# Patient Record
Sex: FEMALE | Race: WHITE | NOT HISPANIC OR LATINO | URBAN - METROPOLITAN AREA
[De-identification: names, ages, dates, MRNs, and addresses within clinical notes are randomized per-mention and may not be internally consistent; named-entity substitution may affect disease eponyms.]

---

## 2022-09-27 ENCOUNTER — TRANSCRIBE ORDERS (OUTPATIENT)
Dept: ADMINISTRATIVE | Facility: HOSPITAL | Age: 57
End: 2022-09-27

## 2022-09-27 DIAGNOSIS — Z12.31 SCREENING MAMMOGRAM FOR BREAST CANCER: Primary | ICD-10-CM

## 2022-10-05 ENCOUNTER — HOSPITAL ENCOUNTER (OUTPATIENT)
Dept: MAMMOGRAPHY | Facility: HOSPITAL | Age: 57
Discharge: HOME | End: 2022-10-05

## 2022-10-05 DIAGNOSIS — Z12.31 SCREENING MAMMOGRAM FOR BREAST CANCER: ICD-10-CM

## 2022-10-07 ENCOUNTER — TRANSCRIBE ORDERS (OUTPATIENT)
Dept: ADMINISTRATIVE | Facility: HOSPITAL | Age: 57
End: 2022-10-07

## 2022-10-07 DIAGNOSIS — N64.4 BREAST PAIN: Primary | ICD-10-CM

## 2022-10-14 ENCOUNTER — HOSPITAL ENCOUNTER (OUTPATIENT)
Dept: MAMMOGRAPHY | Facility: HOSPITAL | Age: 57
Discharge: HOME | End: 2022-10-14

## 2022-10-14 ENCOUNTER — HOSPITAL ENCOUNTER (OUTPATIENT)
Dept: ULTRASOUND IMAGING | Facility: HOSPITAL | Age: 57
Discharge: HOME | End: 2022-10-14

## 2022-10-14 DIAGNOSIS — N64.4 BREAST PAIN: ICD-10-CM

## 2022-10-14 PROCEDURE — 77066 DX MAMMO INCL CAD BI: CPT

## 2022-10-14 PROCEDURE — 76642 ULTRASOUND BREAST LIMITED: CPT

## 2022-10-14 PROCEDURE — G0279 TOMOSYNTHESIS, MAMMO: HCPCS

## 2023-02-20 ENCOUNTER — APPOINTMENT (OUTPATIENT)
Dept: CT IMAGING | Facility: HOSPITAL | Age: 58
End: 2023-02-20
Payer: COMMERCIAL

## 2023-02-20 ENCOUNTER — HOSPITAL ENCOUNTER (EMERGENCY)
Facility: HOSPITAL | Age: 58
Discharge: HOME | End: 2023-02-20
Attending: EMERGENCY MEDICINE | Admitting: SURGERY
Payer: COMMERCIAL

## 2023-02-20 ENCOUNTER — ANESTHESIA EVENT (OUTPATIENT)
Dept: PERIOP | Facility: HOSPITAL | Age: 58
End: 2023-02-20
Payer: COMMERCIAL

## 2023-02-20 ENCOUNTER — ANESTHESIA (OUTPATIENT)
Dept: PERIOP | Facility: HOSPITAL | Age: 58
End: 2023-02-20
Payer: COMMERCIAL

## 2023-02-20 VITALS
HEART RATE: 87 BPM | OXYGEN SATURATION: 93 % | RESPIRATION RATE: 16 BRPM | HEIGHT: 64 IN | SYSTOLIC BLOOD PRESSURE: 96 MMHG | WEIGHT: 169.53 LBS | DIASTOLIC BLOOD PRESSURE: 57 MMHG | TEMPERATURE: 97 F | BODY MASS INDEX: 28.94 KG/M2

## 2023-02-20 DIAGNOSIS — K35.80 ACUTE APPENDICITIS, UNSPECIFIED ACUTE APPENDICITIS TYPE: Primary | ICD-10-CM

## 2023-02-20 PROBLEM — K35.30 ACUTE APPENDICITIS WITH LOCALIZED PERITONITIS: Status: ACTIVE | Noted: 2023-02-20

## 2023-02-20 LAB
ALBUMIN SERPL-MCNC: 4.6 G/DL (ref 3.5–5.2)
ALBUMIN/GLOB SERPL: 1.4 G/DL
ALP SERPL-CCNC: 72 U/L (ref 39–117)
ALT SERPL W P-5'-P-CCNC: 46 U/L (ref 1–33)
ANION GAP SERPL CALCULATED.3IONS-SCNC: 9.4 MMOL/L (ref 5–15)
AST SERPL-CCNC: 33 U/L (ref 1–32)
BASOPHILS # BLD AUTO: 0.04 10*3/MM3 (ref 0–0.2)
BASOPHILS NFR BLD AUTO: 0.3 % (ref 0–1.5)
BILIRUB SERPL-MCNC: 0.3 MG/DL (ref 0–1.2)
BILIRUB UR QL STRIP: NEGATIVE
BUN SERPL-MCNC: 17 MG/DL (ref 6–20)
BUN/CREAT SERPL: 23.9 (ref 7–25)
CALCIUM SPEC-SCNC: 9.8 MG/DL (ref 8.6–10.5)
CHLORIDE SERPL-SCNC: 102 MMOL/L (ref 98–107)
CLARITY UR: CLEAR
CO2 SERPL-SCNC: 25.6 MMOL/L (ref 22–29)
COLOR UR: YELLOW
CREAT SERPL-MCNC: 0.71 MG/DL (ref 0.57–1)
D-LACTATE SERPL-SCNC: 1.9 MMOL/L (ref 0.5–2)
DEPRECATED RDW RBC AUTO: 41.1 FL (ref 37–54)
EGFRCR SERPLBLD CKD-EPI 2021: 99.3 ML/MIN/1.73
EOSINOPHIL # BLD AUTO: 0.1 10*3/MM3 (ref 0–0.4)
EOSINOPHIL NFR BLD AUTO: 0.8 % (ref 0.3–6.2)
ERYTHROCYTE [DISTWIDTH] IN BLOOD BY AUTOMATED COUNT: 12.5 % (ref 12.3–15.4)
FLUAV AG NPH QL: NEGATIVE
FLUBV AG NPH QL IA: NEGATIVE
GLOBULIN UR ELPH-MCNC: 3.2 GM/DL
GLUCOSE BLDC GLUCOMTR-MCNC: 112 MG/DL (ref 70–99)
GLUCOSE SERPL-MCNC: 134 MG/DL (ref 65–99)
GLUCOSE UR STRIP-MCNC: NEGATIVE MG/DL
HCT VFR BLD AUTO: 41.3 % (ref 34–46.6)
HGB BLD-MCNC: 13.7 G/DL (ref 12–15.9)
HGB UR QL STRIP.AUTO: NEGATIVE
HOLD SPECIMEN: NORMAL
HOLD SPECIMEN: NORMAL
IMM GRANULOCYTES # BLD AUTO: 0.05 10*3/MM3 (ref 0–0.05)
IMM GRANULOCYTES NFR BLD AUTO: 0.4 % (ref 0–0.5)
KETONES UR QL STRIP: NEGATIVE
LEUKOCYTE ESTERASE UR QL STRIP.AUTO: NEGATIVE
LIPASE SERPL-CCNC: 30 U/L (ref 13–60)
LYMPHOCYTES # BLD AUTO: 1.3 10*3/MM3 (ref 0.7–3.1)
LYMPHOCYTES NFR BLD AUTO: 10.1 % (ref 19.6–45.3)
MCH RBC QN AUTO: 29.8 PG (ref 26.6–33)
MCHC RBC AUTO-ENTMCNC: 33.2 G/DL (ref 31.5–35.7)
MCV RBC AUTO: 90 FL (ref 79–97)
MONOCYTES # BLD AUTO: 0.74 10*3/MM3 (ref 0.1–0.9)
MONOCYTES NFR BLD AUTO: 5.8 % (ref 5–12)
NEUTROPHILS NFR BLD AUTO: 10.58 10*3/MM3 (ref 1.7–7)
NEUTROPHILS NFR BLD AUTO: 82.6 % (ref 42.7–76)
NITRITE UR QL STRIP: NEGATIVE
NRBC BLD AUTO-RTO: 0 /100 WBC (ref 0–0.2)
PH UR STRIP.AUTO: 7.5 [PH] (ref 5–8)
PLATELET # BLD AUTO: 220 10*3/MM3 (ref 140–450)
PMV BLD AUTO: 10.6 FL (ref 6–12)
POTASSIUM SERPL-SCNC: 4.3 MMOL/L (ref 3.5–5.2)
PROT SERPL-MCNC: 7.8 G/DL (ref 6–8.5)
PROT UR QL STRIP: NEGATIVE
RBC # BLD AUTO: 4.59 10*6/MM3 (ref 3.77–5.28)
SARS-COV-2 RNA RESP QL NAA+PROBE: NOT DETECTED
SODIUM SERPL-SCNC: 137 MMOL/L (ref 136–145)
SP GR UR STRIP: >1.03 (ref 1–1.03)
TROPONIN T SERPL HS-MCNC: <6 NG/L
UROBILINOGEN UR QL STRIP: ABNORMAL
WBC NRBC COR # BLD: 12.81 10*3/MM3 (ref 3.4–10.8)
WHOLE BLOOD HOLD COAG: NORMAL
WHOLE BLOOD HOLD SPECIMEN: NORMAL

## 2023-02-20 PROCEDURE — 25010000002 MORPHINE PER 10 MG: Performed by: EMERGENCY MEDICINE

## 2023-02-20 PROCEDURE — 84484 ASSAY OF TROPONIN QUANT: CPT | Performed by: EMERGENCY MEDICINE

## 2023-02-20 PROCEDURE — 25010000002 KETOROLAC TROMETHAMINE PER 15 MG: Performed by: NURSE ANESTHETIST, CERTIFIED REGISTERED

## 2023-02-20 PROCEDURE — 25010000002 FENTANYL CITRATE (PF) 50 MCG/ML SOLUTION: Performed by: NURSE ANESTHETIST, CERTIFIED REGISTERED

## 2023-02-20 PROCEDURE — 96365 THER/PROPH/DIAG IV INF INIT: CPT

## 2023-02-20 PROCEDURE — C9803 HOPD COVID-19 SPEC COLLECT: HCPCS | Performed by: EMERGENCY MEDICINE

## 2023-02-20 PROCEDURE — 87804 INFLUENZA ASSAY W/OPTIC: CPT | Performed by: EMERGENCY MEDICINE

## 2023-02-20 PROCEDURE — 44970 LAPAROSCOPY APPENDECTOMY: CPT | Performed by: SURGERY

## 2023-02-20 PROCEDURE — 25010000002 PROPOFOL 10 MG/ML EMULSION: Performed by: NURSE ANESTHETIST, CERTIFIED REGISTERED

## 2023-02-20 PROCEDURE — 85025 COMPLETE CBC W/AUTO DIFF WBC: CPT | Performed by: EMERGENCY MEDICINE

## 2023-02-20 PROCEDURE — 44970 LAPAROSCOPY APPENDECTOMY: CPT

## 2023-02-20 PROCEDURE — 74177 CT ABD & PELVIS W/CONTRAST: CPT

## 2023-02-20 PROCEDURE — 99284 EMERGENCY DEPT VISIT MOD MDM: CPT

## 2023-02-20 PROCEDURE — 83690 ASSAY OF LIPASE: CPT | Performed by: EMERGENCY MEDICINE

## 2023-02-20 PROCEDURE — 25010000002 ONDANSETRON PER 1 MG: Performed by: NURSE ANESTHETIST, CERTIFIED REGISTERED

## 2023-02-20 PROCEDURE — 99223 1ST HOSP IP/OBS HIGH 75: CPT | Performed by: SURGERY

## 2023-02-20 PROCEDURE — U0004 COV-19 TEST NON-CDC HGH THRU: HCPCS | Performed by: EMERGENCY MEDICINE

## 2023-02-20 PROCEDURE — C1889 IMPLANT/INSERT DEVICE, NOC: HCPCS | Performed by: SURGERY

## 2023-02-20 PROCEDURE — 25010000002 BUPRENORPHINE PER 0.1 MG: Performed by: SURGERY

## 2023-02-20 PROCEDURE — 80053 COMPREHEN METABOLIC PANEL: CPT | Performed by: EMERGENCY MEDICINE

## 2023-02-20 PROCEDURE — 25010000002 ONDANSETRON PER 1 MG: Performed by: EMERGENCY MEDICINE

## 2023-02-20 PROCEDURE — 25010000002 MIDAZOLAM PER 1 MG: Performed by: ANESTHESIOLOGY

## 2023-02-20 PROCEDURE — 88304 TISSUE EXAM BY PATHOLOGIST: CPT | Performed by: SURGERY

## 2023-02-20 PROCEDURE — 0 IOPAMIDOL PER 1 ML: Performed by: EMERGENCY MEDICINE

## 2023-02-20 PROCEDURE — 25010000002 DEXAMETHASONE SODIUM PHOSPHATE 100 MG/10ML SOLUTION: Performed by: SURGERY

## 2023-02-20 PROCEDURE — 96375 TX/PRO/DX INJ NEW DRUG ADDON: CPT

## 2023-02-20 PROCEDURE — 25010000002 PIPERACILLIN SOD-TAZOBACTAM PER 1 G: Performed by: PHYSICIAN ASSISTANT

## 2023-02-20 PROCEDURE — 93005 ELECTROCARDIOGRAM TRACING: CPT

## 2023-02-20 PROCEDURE — 81003 URINALYSIS AUTO W/O SCOPE: CPT | Performed by: EMERGENCY MEDICINE

## 2023-02-20 PROCEDURE — 25010000002 DEXAMETHASONE PER 1 MG: Performed by: NURSE ANESTHETIST, CERTIFIED REGISTERED

## 2023-02-20 PROCEDURE — 82962 GLUCOSE BLOOD TEST: CPT

## 2023-02-20 PROCEDURE — 83605 ASSAY OF LACTIC ACID: CPT | Performed by: EMERGENCY MEDICINE

## 2023-02-20 DEVICE — LIGAMAX 5 MM ENDOSCOPIC MULTIPLE CLIP APPLIER
Type: IMPLANTABLE DEVICE | Site: CECUM | Status: FUNCTIONAL
Brand: LIGAMAX

## 2023-02-20 DEVICE — ENDOPATH ECHELON ENDOSCOPIC LINEAR CUTTER RELOADS, BLUE, 60MM
Type: IMPLANTABLE DEVICE | Site: CECUM | Status: FUNCTIONAL
Brand: ECHELON ENDOPATH

## 2023-02-20 RX ORDER — ONDANSETRON 2 MG/ML
4 INJECTION INTRAMUSCULAR; INTRAVENOUS ONCE AS NEEDED
Status: DISCONTINUED | OUTPATIENT
Start: 2023-02-20 | End: 2023-02-20 | Stop reason: HOSPADM

## 2023-02-20 RX ORDER — PROMETHAZINE HYDROCHLORIDE 12.5 MG/1
25 TABLET ORAL ONCE AS NEEDED
Status: DISCONTINUED | OUTPATIENT
Start: 2023-02-20 | End: 2023-02-20 | Stop reason: HOSPADM

## 2023-02-20 RX ORDER — OXYCODONE HYDROCHLORIDE AND ACETAMINOPHEN 5; 325 MG/1; MG/1
1 TABLET ORAL EVERY 6 HOURS PRN
Qty: 8 TABLET | Refills: 0 | Status: SHIPPED | OUTPATIENT
Start: 2023-02-20

## 2023-02-20 RX ORDER — OXYCODONE HYDROCHLORIDE 5 MG/1
5 TABLET ORAL
Status: DISCONTINUED | OUTPATIENT
Start: 2023-02-20 | End: 2023-02-20 | Stop reason: HOSPADM

## 2023-02-20 RX ORDER — SUCCINYLCHOLINE/SOD CL,ISO/PF 100 MG/5ML
SYRINGE (ML) INTRAVENOUS AS NEEDED
Status: DISCONTINUED | OUTPATIENT
Start: 2023-02-20 | End: 2023-02-20 | Stop reason: SURG

## 2023-02-20 RX ORDER — ONDANSETRON 2 MG/ML
INJECTION INTRAMUSCULAR; INTRAVENOUS AS NEEDED
Status: DISCONTINUED | OUTPATIENT
Start: 2023-02-20 | End: 2023-02-20 | Stop reason: SURG

## 2023-02-20 RX ORDER — FENTANYL CITRATE 50 UG/ML
INJECTION, SOLUTION INTRAMUSCULAR; INTRAVENOUS AS NEEDED
Status: DISCONTINUED | OUTPATIENT
Start: 2023-02-20 | End: 2023-02-20 | Stop reason: SURG

## 2023-02-20 RX ORDER — PROPOFOL 10 MG/ML
VIAL (ML) INTRAVENOUS AS NEEDED
Status: DISCONTINUED | OUTPATIENT
Start: 2023-02-20 | End: 2023-02-20 | Stop reason: SURG

## 2023-02-20 RX ORDER — ALBUTEROL SULFATE 90 UG/1
1-2 AEROSOL, METERED RESPIRATORY (INHALATION) EVERY 4 HOURS PRN
COMMUNITY
Start: 2023-01-02

## 2023-02-20 RX ORDER — MAGNESIUM HYDROXIDE 1200 MG/15ML
LIQUID ORAL AS NEEDED
Status: DISCONTINUED | OUTPATIENT
Start: 2023-02-20 | End: 2023-02-20 | Stop reason: HOSPADM

## 2023-02-20 RX ORDER — POLYETHYLENE GLYCOL 3350 17 G/17G
17 POWDER, FOR SOLUTION ORAL DAILY
Qty: 5 PACKET | Refills: 0 | Status: SHIPPED | OUTPATIENT
Start: 2023-02-20

## 2023-02-20 RX ORDER — MIDAZOLAM HYDROCHLORIDE 1 MG/ML
INJECTION INTRAMUSCULAR; INTRAVENOUS
Status: DISCONTINUED
Start: 2023-02-20 | End: 2023-02-20 | Stop reason: HOSPADM

## 2023-02-20 RX ORDER — LIDOCAINE HYDROCHLORIDE 20 MG/ML
INJECTION, SOLUTION EPIDURAL; INFILTRATION; INTRACAUDAL; PERINEURAL AS NEEDED
Status: DISCONTINUED | OUTPATIENT
Start: 2023-02-20 | End: 2023-02-20 | Stop reason: SURG

## 2023-02-20 RX ORDER — SODIUM CHLORIDE 0.9 % (FLUSH) 0.9 %
10 SYRINGE (ML) INJECTION AS NEEDED
Status: DISCONTINUED | OUTPATIENT
Start: 2023-02-20 | End: 2023-02-20 | Stop reason: HOSPADM

## 2023-02-20 RX ORDER — MIDAZOLAM HYDROCHLORIDE 1 MG/ML
2 INJECTION INTRAMUSCULAR; INTRAVENOUS ONCE
Status: COMPLETED | OUTPATIENT
Start: 2023-02-20 | End: 2023-02-20

## 2023-02-20 RX ORDER — PROMETHAZINE HYDROCHLORIDE 25 MG/1
25 SUPPOSITORY RECTAL ONCE AS NEEDED
Status: DISCONTINUED | OUTPATIENT
Start: 2023-02-20 | End: 2023-02-20 | Stop reason: HOSPADM

## 2023-02-20 RX ORDER — FLUTICASONE PROPIONATE 50 MCG
1 SPRAY, SUSPENSION (ML) NASAL 2 TIMES DAILY PRN
COMMUNITY
Start: 2023-01-02

## 2023-02-20 RX ORDER — ONDANSETRON 2 MG/ML
4 INJECTION INTRAMUSCULAR; INTRAVENOUS ONCE
Status: COMPLETED | OUTPATIENT
Start: 2023-02-20 | End: 2023-02-20

## 2023-02-20 RX ORDER — ROCURONIUM BROMIDE 10 MG/ML
INJECTION, SOLUTION INTRAVENOUS AS NEEDED
Status: DISCONTINUED | OUTPATIENT
Start: 2023-02-20 | End: 2023-02-20 | Stop reason: SURG

## 2023-02-20 RX ORDER — KETOROLAC TROMETHAMINE 30 MG/ML
INJECTION, SOLUTION INTRAMUSCULAR; INTRAVENOUS AS NEEDED
Status: DISCONTINUED | OUTPATIENT
Start: 2023-02-20 | End: 2023-02-20 | Stop reason: SURG

## 2023-02-20 RX ORDER — PHENYLEPHRINE HCL IN 0.9% NACL 1 MG/10 ML
SYRINGE (ML) INTRAVENOUS AS NEEDED
Status: DISCONTINUED | OUTPATIENT
Start: 2023-02-20 | End: 2023-02-20 | Stop reason: SURG

## 2023-02-20 RX ORDER — SCOLOPAMINE TRANSDERMAL SYSTEM 1 MG/1
1 PATCH, EXTENDED RELEASE TRANSDERMAL ONCE
Status: DISCONTINUED | OUTPATIENT
Start: 2023-02-20 | End: 2023-02-20 | Stop reason: HOSPADM

## 2023-02-20 RX ORDER — SODIUM CHLORIDE, SODIUM LACTATE, POTASSIUM CHLORIDE, CALCIUM CHLORIDE 600; 310; 30; 20 MG/100ML; MG/100ML; MG/100ML; MG/100ML
9 INJECTION, SOLUTION INTRAVENOUS CONTINUOUS PRN
Status: DISCONTINUED | OUTPATIENT
Start: 2023-02-20 | End: 2023-02-20 | Stop reason: HOSPADM

## 2023-02-20 RX ORDER — BUDESONIDE AND FORMOTEROL FUMARATE DIHYDRATE 160; 4.5 UG/1; UG/1
2 AEROSOL RESPIRATORY (INHALATION) 2 TIMES DAILY
COMMUNITY
End: 2023-02-20 | Stop reason: HOSPADM

## 2023-02-20 RX ORDER — SODIUM CHLORIDE, SODIUM LACTATE, POTASSIUM CHLORIDE, CALCIUM CHLORIDE 600; 310; 30; 20 MG/100ML; MG/100ML; MG/100ML; MG/100ML
50 INJECTION, SOLUTION INTRAVENOUS CONTINUOUS
Status: CANCELLED | OUTPATIENT
Start: 2023-02-20

## 2023-02-20 RX ORDER — LORATADINE 10 MG/1
1 TABLET ORAL DAILY
COMMUNITY
Start: 2023-01-28

## 2023-02-20 RX ORDER — DEXAMETHASONE SODIUM PHOSPHATE 4 MG/ML
INJECTION, SOLUTION INTRA-ARTICULAR; INTRALESIONAL; INTRAMUSCULAR; INTRAVENOUS; SOFT TISSUE AS NEEDED
Status: DISCONTINUED | OUTPATIENT
Start: 2023-02-20 | End: 2023-02-20 | Stop reason: SURG

## 2023-02-20 RX ORDER — MEPERIDINE HYDROCHLORIDE 25 MG/ML
12.5 INJECTION INTRAMUSCULAR; INTRAVENOUS; SUBCUTANEOUS
Status: DISCONTINUED | OUTPATIENT
Start: 2023-02-20 | End: 2023-02-20 | Stop reason: HOSPADM

## 2023-02-20 RX ORDER — OMEPRAZOLE 40 MG/1
40 CAPSULE, DELAYED RELEASE ORAL DAILY
COMMUNITY

## 2023-02-20 RX ADMIN — SCOPALAMINE 1 PATCH: 1 PATCH, EXTENDED RELEASE TRANSDERMAL at 12:42

## 2023-02-20 RX ADMIN — ONDANSETRON 4 MG: 2 INJECTION INTRAMUSCULAR; INTRAVENOUS at 09:26

## 2023-02-20 RX ADMIN — MIDAZOLAM HYDROCHLORIDE 2 MG: 1 INJECTION, SOLUTION INTRAMUSCULAR; INTRAVENOUS at 12:42

## 2023-02-20 RX ADMIN — FENTANYL CITRATE 100 MCG: 50 INJECTION, SOLUTION INTRAMUSCULAR; INTRAVENOUS at 13:01

## 2023-02-20 RX ADMIN — ONDANSETRON 4 MG: 2 INJECTION INTRAMUSCULAR; INTRAVENOUS at 13:02

## 2023-02-20 RX ADMIN — Medication 100 MG: at 12:57

## 2023-02-20 RX ADMIN — KETOROLAC TROMETHAMINE 30 MG: 30 INJECTION, SOLUTION INTRAMUSCULAR; INTRAVENOUS at 13:16

## 2023-02-20 RX ADMIN — SODIUM CHLORIDE 1000 ML: 9 INJECTION, SOLUTION INTRAVENOUS at 09:25

## 2023-02-20 RX ADMIN — ROCURONIUM BROMIDE 50 MG: 10 INJECTION, SOLUTION INTRAVENOUS at 13:03

## 2023-02-20 RX ADMIN — LIDOCAINE HYDROCHLORIDE 80 MG: 20 INJECTION, SOLUTION EPIDURAL; INFILTRATION; INTRACAUDAL; PERINEURAL at 12:57

## 2023-02-20 RX ADMIN — Medication 200 MCG: at 13:10

## 2023-02-20 RX ADMIN — DEXAMETHASONE SODIUM PHOSPHATE 8 MG: 4 INJECTION, SOLUTION INTRA-ARTICULAR; INTRALESIONAL; INTRAMUSCULAR; INTRAVENOUS; SOFT TISSUE at 13:02

## 2023-02-20 RX ADMIN — SUGAMMADEX 200 MG: 100 INJECTION, SOLUTION INTRAVENOUS at 13:23

## 2023-02-20 RX ADMIN — SODIUM CHLORIDE, POTASSIUM CHLORIDE, SODIUM LACTATE AND CALCIUM CHLORIDE 9 ML/HR: 600; 310; 30; 20 INJECTION, SOLUTION INTRAVENOUS at 12:42

## 2023-02-20 RX ADMIN — IOPAMIDOL 100 ML: 755 INJECTION, SOLUTION INTRAVENOUS at 08:41

## 2023-02-20 RX ADMIN — MORPHINE SULFATE 4 MG: 4 INJECTION, SOLUTION INTRAMUSCULAR; INTRAVENOUS at 09:26

## 2023-02-20 RX ADMIN — TAZOBACTAM SODIUM AND PIPERACILLIN SODIUM 3.38 G: 375; 3 INJECTION, SOLUTION INTRAVENOUS at 10:33

## 2023-02-20 RX ADMIN — PROPOFOL 200 MG: 10 INJECTION, EMULSION INTRAVENOUS at 12:57

## 2023-02-20 NOTE — ED PROVIDER NOTES
Time: 8:28 AM EST  Date of encounter:  2/20/2023  Independent Historian/Clinical History and Information was obtained by:   Patient  Chief Complaint: abdominal pain    History is limited by: N/A    History of Present Illness:  Patient is a 57 y.o. year old female who presents to the emergency department for evaluation of abdominal pain.  Abdominal pain is located right lower quadrant starting last evening.  Has also had waxing waning epigastric pain described as feeling like a spasm or a knot.  Associate with nausea, no vomiting.  No diarrhea.  Denies dysuria, hematuria.  Has not taken any medications PTA.  Denies URI symptoms. Has had a headache and fluttering in chest.     HPI    Patient Care Team  Primary Care Provider: Provider, No Known    Past Medical History:     Allergies   Allergen Reactions   • Hydrocodone Nausea And Vomiting   • Codeine Unknown - Low Severity   • Penicillins Unknown - Low Severity     As a child     Past Medical History:   Diagnosis Date   • Acute appendicitis with localized peritonitis 02/20/2023   • Asthma      Past Surgical History:   Procedure Laterality Date   • BREAST IMPLANT REMOVAL     • BREAST IMPLANT SURGERY Bilateral    • TUBAL ABDOMINAL LIGATION       Family History   Problem Relation Age of Onset   • No Known Problems Mother    • No Known Problems Father        Home Medications:  Prior to Admission medications    Not on File        Social History:   Social History     Tobacco Use   • Smoking status: Former     Types: Cigarettes     Quit date: 2013     Years since quitting: 10.1         Review of Systems:  Review of Systems   Constitutional: Negative for chills and fever.   HENT: Negative for congestion and sore throat.    Respiratory: Negative for cough and shortness of breath.    Cardiovascular: Positive for palpitations. Negative for chest pain.   Gastrointestinal: Positive for abdominal pain and nausea. Negative for diarrhea and vomiting.   Genitourinary: Negative for  "dysuria.   Neurological: Positive for headaches.   All other systems reviewed and are negative.       Physical Exam:  /66   Pulse 99   Temp 98.4 °F (36.9 °C) (Oral)   Resp 20   Ht 162.6 cm (64\")   Wt 76.9 kg (169 lb 8.5 oz)   SpO2 96%   BMI 29.10 kg/m²     Physical Exam  Vitals and nursing note reviewed.   Constitutional:       Appearance: Normal appearance. She is not ill-appearing or toxic-appearing.   HENT:      Head: Normocephalic.      Nose: Nose normal.      Mouth/Throat:      Mouth: Mucous membranes are moist.   Eyes:      Conjunctiva/sclera: Conjunctivae normal.   Cardiovascular:      Rate and Rhythm: Normal rate and regular rhythm.   Pulmonary:      Effort: Pulmonary effort is normal.      Breath sounds: Normal breath sounds.   Abdominal:      General: Abdomen is flat. Bowel sounds are normal.      Palpations: Abdomen is soft.      Tenderness: There is abdominal tenderness in the right lower quadrant and epigastric area. There is guarding and rebound. Positive signs include Rovsing's sign and McBurney's sign.   Musculoskeletal:         General: Normal range of motion.      Cervical back: Normal range of motion.   Skin:     General: Skin is warm and dry.      Capillary Refill: Capillary refill takes less than 2 seconds.   Neurological:      Mental Status: She is alert.                  Procedures:  Procedures      Medical Decision Making:      Comorbidities that affect care:    None    External Notes reviewed:    Previous ED Note      The following orders were placed and all results were independently analyzed by me:  Orders Placed This Encounter   Procedures   • COVID-19,APTIMA PANTHER(JUAN ALBERTO),[facility]/ [facility], NP/OP SWAB IN UTM/VTM/SALINE TRANSPORT MEDIA,24 HR TAT - Swab, Nasal Cavity   • Influenza Antigen, Rapid - Swab, Nasopharynx   • CT Abdomen Pelvis With Contrast   • [place] Draw   • Comprehensive Metabolic Panel   • Lipase   • Urinalysis With Microscopic If Indicated (No Culture) - Urine, " "Clean Catch   • Lactic Acid, Plasma   • CBC Auto Differential   • Single High Sensitivity Troponin T   • NPO Diet NPO Type: Strict NPO   • Undress & Gown   • Obtain Informed Consent.   • Place Sequential Compression Device on Patient in Pre-Op   • Perform Chlorhexidine Skin Prep Night Before and Morning of Procedure   • IP Consult to General Surgery   • POC Glucose Once   • ECG 12 Lead Other; \"fluttering\"   • Insert Peripheral IV   • CBC & Differential   • Green Top (Gel)   • Lavender Top   • Gold Top - SST   • Light Blue Top       Medications Given in the Emergency Department:  Medications   sodium chloride 0.9 % flush 10 mL (has no administration in time range)   piperacillin-tazobactam (ZOSYN) 3.375 g in iso-osmotic dextrose 50 ml (premix) (has no administration in time range)   ondansetron (ZOFRAN) injection 4 mg (4 mg Intravenous Given 2/20/23 0926)   morphine injection 4 mg (4 mg Intravenous Given 2/20/23 0926)   sodium chloride 0.9 % bolus 1,000 mL (1,000 mL Intravenous New Bag 2/20/23 0925)   iopamidol (ISOVUE-370) 76 % injection 100 mL (100 mL Intravenous Given 2/20/23 0841)        ED Course:         Labs:    Lab Results (last 24 hours)     Procedure Component Value Units Date/Time    CBC & Differential [[id number]]  (Abnormal) Collected: 02/20/23 0820    Specimen: Blood Updated: 02/20/23 0833    Narrative:      The following orders were created for panel order CBC & Differential.  Procedure                               Abnormality         Status                     ---------                               -----------         ------                     CBC Auto Differential[[id number]]        Abnormal            Final result                 Please view results for these tests on the individual orders.    Comprehensive Metabolic Panel [[id number]]  (Abnormal) Collected: 02/20/23 0820    Specimen: Blood Updated: 02/20/23 0852     Glucose 134 mg/dL      BUN 17 mg/dL      Creatinine 0.71 mg/dL      Sodium 137 " mmol/L      Potassium 4.3 mmol/L      Comment: Slight hemolysis detected by analyzer. Results may be affected.        Chloride 102 mmol/L      CO2 25.6 mmol/L      Calcium 9.8 mg/dL      Total Protein 7.8 g/dL      Albumin 4.6 g/dL      ALT (SGPT) 46 U/L      AST (SGOT) 33 U/L      Alkaline Phosphatase 72 U/L      Total Bilirubin 0.3 mg/dL      Globulin 3.2 gm/dL      A/G Ratio 1.4 g/dL      BUN/Creatinine Ratio 23.9     Anion Gap 9.4 mmol/L      eGFR 99.3 mL/min/1.73     Narrative:      GFR Normal >60  Chronic Kidney Disease <60  Kidney Failure <15      Lipase [[id number]]  (Normal) Collected: 02/20/23 0820    Specimen: Blood Updated: 02/20/23 0852     Lipase 30 U/L     Lactic Acid, Plasma [[id number]]  (Normal) Collected: 02/20/23 0820    Specimen: Blood Updated: 02/20/23 0848     Lactate 1.9 mmol/L     CBC Auto Differential [[id number]]  (Abnormal) Collected: 02/20/23 0820    Specimen: Blood Updated: 02/20/23 0833     WBC 12.81 10*3/mm3      RBC 4.59 10*6/mm3      Hemoglobin 13.7 g/dL      Hematocrit 41.3 %      MCV 90.0 fL      MCH 29.8 pg      MCHC 33.2 g/dL      RDW 12.5 %      RDW-SD 41.1 fl      MPV 10.6 fL      Platelets 220 10*3/mm3      Neutrophil % 82.6 %      Lymphocyte % 10.1 %      Monocyte % 5.8 %      Eosinophil % 0.8 %      Basophil % 0.3 %      Immature Grans % 0.4 %      Neutrophils, Absolute 10.58 10*3/mm3      Lymphocytes, Absolute 1.30 10*3/mm3      Monocytes, Absolute 0.74 10*3/mm3      Eosinophils, Absolute 0.10 10*3/mm3      Basophils, Absolute 0.04 10*3/mm3      Immature Grans, Absolute 0.05 10*3/mm3      nRBC 0.0 /100 WBC     Single High Sensitivity Troponin T [[id number]]  (Normal) Collected: 02/20/23 0820    Specimen: Blood Updated: 02/20/23 0852     HS Troponin T <6 ng/L     Narrative:      High Sensitive Troponin T Reference Range:  <10.0 ng/L- Negative Female for AMI  <15.0 ng/L- Negative Male for AMI  >=10 - Abnormal Female indicating possible myocardial injury.  >=15 - Abnormal Male  indicating possible myocardial injury.   Clinicians would have to utilize clinical acumen, EKG, Troponin, and serial changes to determine if it is an Acute Myocardial Infarction or myocardial injury due to an underlying chronic condition.         COVID-19,APTIMA PANTHER(JUAN ALBERTO),[facility], NP/OP SWAB IN UTM/VTM/SALINE TRANSPORT MEDIA,24 HR TAT - Swab, Nasal Cavity [[id number]] Collected: 02/20/23 0859    Specimen: Swab from Nasal Cavity Updated: 02/20/23 0902    Influenza Antigen, Rapid - Swab, Nasopharynx [[id number]]  (Normal) Collected: 02/20/23 0859    Specimen: Swab from Nasopharynx Updated: 02/20/23 0937     Influenza A Ag, EIA Negative     Influenza B Ag, EIA Negative    Urinalysis With Microscopic If Indicated (No Culture) - Urine, Clean Catch [[id number]]  (Abnormal) Collected: 02/20/23 0955    Specimen: Urine, Clean Catch Updated: 02/20/23 1017     Color, UA Yellow     Appearance, UA Clear     pH, UA 7.5     Specific Gravity, UA >1.030     Glucose, UA Negative     Ketones, UA Negative     Bilirubin, UA Negative     Blood, UA Negative     Protein, UA Negative     Leuk Esterase, UA Negative     Nitrite, UA Negative     Urobilinogen, UA 0.2 E.U./dL    Narrative:      Urine microscopic not indicated.           Imaging:    CT Abdomen Pelvis With Contrast    Result Date: 2/20/2023  PROCEDURE: CT ABDOMEN PELVIS W CONTRAST  COMPARISON: [facility], CT, ABDOMEN/PELVIS WITH CONTRAST, 10/13/2020, 2:53.  INDICATIONS: RLQ and epigastric abdominal pain  TECHNIQUE: After obtaining the patient's consent, CT images were created with non-ionic intravenous contrast material.   PROTOCOL:   Standard imaging protocol performed    RADIATION:   DLP: 780.7mGy*cm   Automated exposure control was utilized to minimize radiation dose. CONTRAST: 100cc Isovue 370 I.V.  FINDINGS:  There is mild left basilar atelectasis.  The gallbladder, liver, bilateral adrenal glands, bilateral kidneys, pancreas and spleen are normal.  There  is a dilated fluid-filled appendix with a proximal high density consistent with an appendicular lith.  There is surrounding fat stranding.  The remaining portions of the GI tract are unremarkable with a moderate amount of stool throughout the colon to the cecum.  The descending colon is decompressed.  The pelvic visceral structures are normal with a probable uterine fibroid seen near the fundus on the right.  The urinary bladder is incompletely distended but appears normal.  There are mild degenerative changes of the spine but there is no acute osseous abnormality.  There are no lytic or destructive osseous lesions.       Acute appendicitis.   ASHOK GUZMÁN MD       Electronically Signed and Approved By: ASHOK GUZMÁN MD on 2/20/2023 at 9:08                 Differential Diagnosis and Discussion:    Abdominal Pain: Based on the patient's signs and symptoms, I considered abdominal aortic aneurysm, small bowel obstruction, pancreatitis, acute cholecystitis, acute appendecitis, peptic ulcer disease, gastritis, colitis, endocrine disorders, irritable bowel syndrome and other differential diagnosis an etiology of the patient's abdominal pain.  Palpitations: Differential diagnosis includes but is not limited to anxiety, atrioventricular blocks, mitral valve disease, hypoxia, coronary artery disease, hypokalemia, anemia, fever, COPD, congestive heart failure, pericarditis, Marti-Parkinson-White syndrome, pulmonary embolism, SVT, atrial fibrillation, atrial flutter, sinus tachycardia, thyrotoxicosis, and pheochromocytoma.    All labs were reviewed and interpreted by me.  EKG was interpreted by supervising attending.  CT scan radiology interpretation was reviewed by me.    MDM         Patient Care Considerations:          Consultants/Shared Management Plan:    Consultant: I have discussed the case with [facility] who states can take to OR. Requests Zosyn and consent to bedside    Social Determinants of  Health:    Patient is independent, reliable, and has access to care.       Disposition and Care Coordination:    57-year-old female presents to ED with right lower quadrant abdominal pain.  Patient with localized tenderness, Rovsing, McBurney's.  Vitals are reassuring she is normotensive, afebrile, not tachycardic.  Flu negative, COVID pending.  CBC with WBC of 12.1 with neutrophil predominance.  Has had mild floaters but troponin negative and EKG unremarkable.  UA is noninfectious and without hematuria.  Lactate WNL, lipase WNL, no significant electrolyte abnormality and normal renal hepatic function.    CT shows a dilated and fluid-filled appendix with a likely appendicolith and surrounding fat stranding.  Because of the Dr. Armstrong with general surgery.  Can take to the OR for appendectomy.  Recommendation for Zosyn and consent bedside.  The patient is in agreement with the assessment and plan. Has been NPO other than a few sips of water since 11 PM yesterday.       Send to OR/Endoscopy        Final diagnoses:   Acute appendicitis, unspecified acute appendicitis type        ED Disposition     ED Disposition   Send to OR    Condition   --    Comment   --             This medical record created using voice recognition software.           Rober Chavarria PA-C  02/20/23 1030

## 2023-02-20 NOTE — ANESTHESIA POSTPROCEDURE EVALUATION
Patient: Elvia Joyce    Procedure Summary     Date: 02/20/23 Room / Location: [facility] OR  / [facility]    Anesthesia Start: 1250 Anesthesia Stop: 1336    Procedure: APPENDECTOMY LAPAROSCOPIC POSSIBLE OPEN (Abdomen) Diagnosis:       Acute appendicitis, unspecified acute appendicitis type      (Acute appendicitis, unspecified acute appendicitis type [K35.80])    Surgeons: Kenroy Armstrong MD Provider: Hever Odom MD    Anesthesia Type: general ASA Status: 2          Anesthesia Type: general    Vitals  Vitals Value Taken Time   /59 02/20/23 1346   Temp 36.2 °C (97.2 °F) 02/20/23 1336   Pulse 84 02/20/23 1348   Resp 16 02/20/23 1345   SpO2 94 % 02/20/23 1348   Vitals shown include unvalidated device data.        Post Anesthesia Care and Evaluation    Patient location during evaluation: bedside  Patient participation: complete - patient participated  Level of consciousness: awake  Pain management: adequate    Airway patency: patent  Anesthetic complications: No anesthetic complications  PONV Status: none  Cardiovascular status: acceptable and stable  Respiratory status: acceptable  Hydration status: acceptable    Comments: An Anesthesiologist personally participated in the most demanding procedures (including induction and emergence if applicable) in the anesthesia plan, monitored the course of anesthesia administration at frequent intervals and remained physically present and available for immediate diagnosis and treatment of emergencies.

## 2023-02-20 NOTE — OP NOTE
OP NOTE  APPENDECTOMY LAPAROSCOPIC  Procedure Report    Patient Name:  Elvia Joyce  YOB: 1965  [id number]    Date of Surgery:  2/20/2023     Indications: See consult note for indications, discussion of risk benefits and alternatives    Pre-op Diagnosis:   Acute appendicitis, unspecified acute appendicitis type [K35.80]       Post-Op Diagnosis Codes:     * Acute appendicitis, unspecified acute appendicitis type [K35.80]    Procedure/CPT® Codes:      Procedure(s):  APPENDECTOMY LAPAROSCOPIC     Staff:  Surgeon(s):  Kenroy Armstrong MD    Assistant: Odalys Rodriguez CSA    Anesthesia: General, Local    Estimated Blood Loss: minimal    Implants:    Implant Name Type Inv. Item Serial No.  Lot No. LRB No. Used Action   RELOAD ECHELON FLEX GST REG 3.6MM LUZ - [id number] Implant RELOAD ECHELON FLEX GST REG 3.6MM LUZ  [employer]  DIV OF J AND J [id number] N/A 1 Implanted   CLIPAPPLR M/ ENDO LIGAMAX5 5MM 33CM MD/RAMÍREZ - [id number] Implant CLIPAPPLR M/ ENDO LIGAMAX5 5MM 33CM MD/RAMÍREZ  [employer]  DIV OF J AND J [id number] N/A 1 Implanted       Specimen:          Specimens     ID Source Type Tests Collected By Collected At Frozen?    A Large Intestine, Appendix Tissue · TISSUE PATHOLOGY EXAM   Kenroy Armstrong MD 2/20/23 1317 No    Description: Appendix            Findings: Inflamed appendix consistent with appendicitis.  No evidence of perforation or abscess.  Normal terminal ileum.  Bleeding from the cecal staple line controlled with hemoclips.    Complications: None    Description of Procedure:   After all questions were answered, consent was verified.  She was brought the operating room per stretcher placed in supine position arms out all extremities padded.  Bilateral lower extremity SCDs placed.  Anesthesia induced.  Left upper extremity padded and tucked.  Patient received preoperative IV antibiotics.  Patient's abdomen prepped with ChloraPrep.  Waited 3 minutes.   We draped in usual sterile fashion.  Ioban applied.  Critical timeout taken.  Began the procedure with a midline incision above the umbilicus.  I entered the abdomen sharply under direct vision without injury to viscera below.  I placed a 12 balloon trocar.  I obtain pneumoperitoneum with CO2 insufflation.  I placed the patient in Trendelenburg and rotated to the left.  I then placed a 5 trocar lower midline and left lower quadrant under direct vision.  Identified the cecum.  I found the appendix.  It was inflamed consistent with appendicitis no evidence of perforation or abscess.  Normal terminal ileum.  I made an opening in the avascular plane of the mesoappendix at its junction with the cecum.  I then divided the lumen of the appendix at its junction with the cecum with a laparoscopic SCAR stapler blue load.  I divided the mesoappendix with the LigaSure device.  Division hemostatic.  Bleeding from the cecal staple line controlled with multiple hemoclips.  Hemostasis obtained.  I suctioned the right lower quadrant.  I placed the appendix in a laparoscopic retrieval device.  I infiltrated with local anesthesia bilateral upper quadrants in a tap block fashion.  I remove the trocars desufflated the abdomen remove the specimen bag.  I closed the umbilical fascial Vicryl.  I closed the incisions with Vicryl and staples.  Dressings applied.  Specimen sent to pathology for permanent.  At the end of the procedure all counts were correct.  I was present for the procedure.    Assistant: Odalys Rodriguez CSA was responsible for performing the following activities: Retraction, Closing, Placing Dressing and Driving camera laparoscopically and their skilled assistance was necessary for the success of this case.    Kenroy Armstrong MD     Date: 2/20/2023  Time: 13:29 EST

## 2023-02-20 NOTE — ANESTHESIA PREPROCEDURE EVALUATION
Anesthesia Evaluation     Patient summary reviewed and Nursing notes reviewed   NPO Solid Status: > 6 hours  NPO Liquid Status: > 6 hours           Airway   Mallampati: II  TM distance: >3 FB  Dental      Pulmonary - normal exam   (+) asthma,  Cardiovascular - negative cardio ROS and normal exam  Exercise tolerance: good (4-7 METS)        Neuro/Psych- negative ROS  GI/Hepatic/Renal/Endo - negative ROS     Musculoskeletal     Abdominal    Substance History      OB/GYN negative ob/gyn ROS         Other                        Anesthesia Plan    ASA 2     general     intravenous induction     Anesthetic plan, risks, benefits, and alternatives have been provided, discussed and informed consent has been obtained with: patient.        CODE STATUS:

## 2023-02-20 NOTE — H&P
History and Physical    Patient Name:  Elvia Joyce  YOB: 1965  [id number]    Referring Provider: Dr. Veronica    Patient Care Team:  Provider, Jennifer Known as PCP - General    Reason for consult/Chief complaint:   RLQ abdominal pain    Subjective .     History of present illness:  Mrs. Joyce is a 57 year old female who presents to the ED at [facility] today with RLQ abdominal pain that started in the middle of the day yesterday.  She reports the pain became worse about 2 o'clock this morning when it woke her abruptly out of her sleep.  She admits nausea and epigastric pain. She denies vomiting, fever, chills, diarrhea, or constipation.  She had a CT scan of the abdomen and pelvis that indicates she has an acute appendicitis.  Her WBC is 12.       History:  Past Medical History:   Diagnosis Date   • Acute appendicitis with localized peritonitis 02/20/2023   • Asthma        Past Surgical History:   Procedure Laterality Date   • BREAST IMPLANT REMOVAL     • BREAST IMPLANT SURGERY Bilateral    • TUBAL ABDOMINAL LIGATION         Family History   Problem Relation Age of Onset   • No Known Problems Mother    • No Known Problems Father        Social History     Tobacco Use   • Smoking status: Former     Types: Cigarettes     Quit date: 2013     Years since quitting: 10.1       Review of Systems:  A complete ROS was performed and all are negative except for what is documented in the HPI.    MEDS:  Prior to Admission medications    Medication Sig Start Date End Date Taking? Authorizing Provider   albuterol sulfate  (90 Base) MCG/ACT inhaler Inhale 1-2 puffs Every 4 (Four) Hours As Needed. 1/2/23   ProviderPaul MD   budesonide-formoterol (Symbicort) 160-4.5 MCG/ACT inhaler Inhale 2 puffs 2 (Two) Times a Day.    ProviderPaul MD   fluticasone (FLONASE) 50 MCG/ACT nasal spray 1 spray by Each Nare route 2 (Two) Times a Day As Needed. 1/2/23   Paul Prescott MD   loratadine  "(CLARITIN) 10 MG tablet Take 1 tablet by mouth Daily. 1/28/23   Provider, MD Paul   omeprazole (priLOSEC) 40 MG capsule Take 40 mg by mouth Daily.    Provider, MD Paul        piperacillin-tazobactam, 3.375 g, Intravenous, Once               Allergies:  Hydrocodone, Codeine, and Penicillins    Objective         Physical Exam:      Constitutuional:  well nourished, no acute distress, appear stated age /66   Pulse 99   Temp 98.4 °F (36.9 °C) (Oral)   Resp 20   Ht 162.6 cm (64\")   Wt 76.9 kg (169 lb 8.5 oz)   SpO2 96%   BMI 29.10 kg/m²    Eyes:  anicteric sclerae, moist conjunctivae, no lid lag, PERRLA  ENMT:  oropharynx clear, moist mucous membranes, good dentition  Neck:   full ROM, trachea midline, no thyromegaly  Cardiovascular: RRR, S1 and S2 present, no MRG's, heart rate 99, no pedal edema  Respiratory: lungs CTA, respirations even and unlabored  GI:  Abdomen soft, RLQ tender, nondistended, no HSM     Skin:  warm and dry, normal turgor, no rashes  Psychiatric:  alert and oriented x3, intact judgement and insight, cooperative         Results Review: I have reviewed the patient's labs and imaging including CBC and BMP, CT of the abd and pelvis    LABS/IMAGING:    WBC   Date Value Ref Range Status   02/20/2023 12.81 (H) 3.40 - 10.80 10*3/mm3 Final     RBC   Date Value Ref Range Status   02/20/2023 4.59 3.77 - 5.28 10*6/mm3 Final     Hemoglobin   Date Value Ref Range Status   02/20/2023 13.7 12.0 - 15.9 g/dL Final     Hematocrit   Date Value Ref Range Status   02/20/2023 41.3 34.0 - 46.6 % Final     MCV   Date Value Ref Range Status   02/20/2023 90.0 79.0 - 97.0 fL Final     MCH   Date Value Ref Range Status   02/20/2023 29.8 26.6 - 33.0 pg Final     MCHC   Date Value Ref Range Status   02/20/2023 33.2 31.5 - 35.7 g/dL Final     RDW   Date Value Ref Range Status   02/20/2023 12.5 12.3 - 15.4 % Final     RDW-SD   Date Value Ref Range Status   02/20/2023 41.1 37.0 - 54.0 fl Final     MPV "   Date Value Ref Range Status   02/20/2023 10.6 6.0 - 12.0 fL Final     Platelets   Date Value Ref Range Status   02/20/2023 220 140 - 450 10*3/mm3 Final     Neutrophil %   Date Value Ref Range Status   02/20/2023 82.6 (H) 42.7 - 76.0 % Final     Lymphocyte %   Date Value Ref Range Status   02/20/2023 10.1 (L) 19.6 - 45.3 % Final     Monocyte %   Date Value Ref Range Status   02/20/2023 5.8 5.0 - 12.0 % Final     Eosinophil %   Date Value Ref Range Status   02/20/2023 0.8 0.3 - 6.2 % Final     Basophil %   Date Value Ref Range Status   02/20/2023 0.3 0.0 - 1.5 % Final     Immature Grans %   Date Value Ref Range Status   02/20/2023 0.4 0.0 - 0.5 % Final     Neutrophils, Absolute   Date Value Ref Range Status   02/20/2023 10.58 (H) 1.70 - 7.00 10*3/mm3 Final     Lymphocytes, Absolute   Date Value Ref Range Status   02/20/2023 1.30 0.70 - 3.10 10*3/mm3 Final     Monocytes, Absolute   Date Value Ref Range Status   02/20/2023 0.74 0.10 - 0.90 10*3/mm3 Final     Eosinophils, Absolute   Date Value Ref Range Status   02/20/2023 0.10 0.00 - 0.40 10*3/mm3 Final     Basophils, Absolute   Date Value Ref Range Status   02/20/2023 0.04 0.00 - 0.20 10*3/mm3 Final     Immature Grans, Absolute   Date Value Ref Range Status   02/20/2023 0.05 0.00 - 0.05 10*3/mm3 Final     nRBC   Date Value Ref Range Status   02/20/2023 0.0 0.0 - 0.2 /100 WBC Final        Basic Metabolic Panel    Sodium Sodium   Date Value Ref Range Status   02/20/2023 137 136 - 145 mmol/L Final      Potassium Potassium   Date Value Ref Range Status   02/20/2023 4.3 3.5 - 5.2 mmol/L Final     Comment:     Slight hemolysis detected by analyzer. Results may be affected.      Chloride Chloride   Date Value Ref Range Status   02/20/2023 102 98 - 107 mmol/L Final      Bicarbonate No results found for: PLASMABICARB   BUN BUN   Date Value Ref Range Status   02/20/2023 17 6 - 20 mg/dL Final      Creatinine Creatinine   Date Value Ref Range Status   02/20/2023 0.71 0.57 - 1.00  mg/dL Final      Calcium Calcium   Date Value Ref Range Status   02/20/2023 9.8 8.6 - 10.5 mg/dL Final      Glucose      No components found for: GLUCOSE.*        Lab Results   Component Value Date    GLUCOSE 134 (H) 02/20/2023    BUN 17 02/20/2023    CREATININE 0.71 02/20/2023    BCR 23.9 02/20/2023    K 4.3 02/20/2023    CO2 25.6 02/20/2023    CALCIUM 9.8 02/20/2023    ALBUMIN 4.6 02/20/2023    LABIL2 1.4 10/12/2020    AST 33 (H) 02/20/2023    ALT 46 (H) 02/20/2023          CT Abdomen Pelvis With Contrast    Result Date: 2/20/2023  PROCEDURE: CT ABDOMEN PELVIS W CONTRAST  COMPARISON: [facility], CT, ABDOMEN/PELVIS WITH CONTRAST, 10/13/2020, 2:53.  INDICATIONS: RLQ and epigastric abdominal pain  TECHNIQUE: After obtaining the patient's consent, CT images were created with non-ionic intravenous contrast material.   PROTOCOL:   Standard imaging protocol performed    RADIATION:   DLP: 780.7mGy*cm   Automated exposure control was utilized to minimize radiation dose. CONTRAST: 100cc Isovue 370 I.V.  FINDINGS:  There is mild left basilar atelectasis.  The gallbladder, liver, bilateral adrenal glands, bilateral kidneys, pancreas and spleen are normal.  There is a dilated fluid-filled appendix with a proximal high density consistent with an appendicular lith.  There is surrounding fat stranding.  The remaining portions of the GI tract are unremarkable with a moderate amount of stool throughout the colon to the cecum.  The descending colon is decompressed.  The pelvic visceral structures are normal with a probable uterine fibroid seen near the fundus on the right.  The urinary bladder is incompletely distended but appears normal.  There are mild degenerative changes of the spine but there is no acute osseous abnormality.  There are no lytic or destructive osseous lesions.      Impression:  Acute appendicitis.   ASHOK GUZMÁN MD       Electronically Signed and Approved By: ASHOK GUZMÁN MD on 2/20/2023 at 9:08                     Assessment & Plan         Acute appendicitis with localized peritonitis      NPO   Case request and consent for laparoscopic appendectomy possible open procedure by Dr. Armstrong Risks, benefits, alternatives discussed            Electronically signed by LARRY Giron, 02/20/23, 10:29 AM EST.

## 2023-02-20 NOTE — DISCHARGE INSTRUCTIONS
DISCHARGE INSTRUCTIONS LAPAROSCOPIC CHOLECYSTECTOMY/APPENDECTOMY  (GALL BLADDER)      For your surgery you had:  General anesthesia (you may have a sore throat for the first 24 hours)    You received a medicated patch for nausea prevention today (behind your ear). It is recommended that you remove it 24-48 hours post-operatively. It must be removed within 72 hours.  You may experience dizziness, drowsiness, or light-headedness for several hours following surgery.  Do not stay alone tonight.  Limit your activity for 24 hours.  Resume your diet slowly.  Follow whatever special dietary instructions you may have been given by your doctor.  You should not drive, operate machinery, drink alcohol, or sign legally binding documents for 24 hours or while you are taking pain medication.  Last dose of pain medication was given at:   .    NOTIFY YOUR DOCTOR IF YOU EXPERIENCE ANY OF THE FOLLOWING:  Temperature greater than 101 degrees Fahrenheit  Shaking Chills  Redness or excessive drainage from incision  Nausea, vomiting and/or pain that is not controlled by prescribed medications  Increase in bleeding or bleeding that is excessive  Unable to urinate in 6 hours after surgery  If unable to reach your doctor, please go to the closest Emergency Room You may remove Band-Aid/dressing  2 days (Thursday morning) .  You may shower  2 days Thursday morning .  Apply an ice pack 24-48 hours.  You may experience gas discomfort 24-48 hours after discharge, especially in chest and shoulders.  Changing position frequently may alleviate this discomfort.  If you have excessive pain, swelling, redness, drainage or other problems, notify your physician.  If unable to urinate in 6 to 8 hours after surgery or urinating frequently in small amounts, notify your doctor or go to the nearest Emergency Room.  Medications per physician instructions as indicated on Discharge Medication Information Sheet.  SPECIAL INSTRUCTIONS:  May take an over the  counter stool softener as needed

## 2023-02-21 LAB — QT INTERVAL: 362 MS

## 2023-02-22 LAB
CYTO UR: NORMAL
LAB AP CASE REPORT: NORMAL
LAB AP CLINICAL INFORMATION: NORMAL
PATH REPORT.FINAL DX SPEC: NORMAL
PATH REPORT.GROSS SPEC: NORMAL

## 2023-03-07 ENCOUNTER — OFFICE VISIT (OUTPATIENT)
Dept: SURGERY | Facility: CLINIC | Age: 58
End: 2023-03-07
Payer: COMMERCIAL

## 2023-03-07 VITALS — HEIGHT: 64 IN | HEART RATE: 77 BPM | WEIGHT: 172 LBS | BODY MASS INDEX: 29.37 KG/M2

## 2023-03-07 DIAGNOSIS — Z90.49 S/P LAPAROSCOPIC APPENDECTOMY: Primary | ICD-10-CM

## 2023-03-07 PROCEDURE — 99024 POSTOP FOLLOW-UP VISIT: CPT | Performed by: NURSE PRACTITIONER

## 2023-03-07 NOTE — PROGRESS NOTES
Chief Complaint: Post-op Follow-up    Subjective      Follow-up visit        History of Present Illness  Elvia Joyce is a 57 y.o. female presents to [facility] for a follow-up visit.     Patient presents today for a follow-up visit after undergoing a laparoscopic appendectomy on 2/20/2023 performed by Dr. Kenroy Armstrong.    Patient was with acute appendicitis and periappendicitis per pathology.    Patient admits to tolerating diet well with no nausea.  Having bowel movements without difficulty.  Patient denies any postoperative complications.    Pathology:  Clinical Information    Acute appendicitis, unspecified acute appendicitis type   Final Diagnosis   Appendix, appendectomy:                - Acute appendicitis and periappendicitis      Electronically signed by Antonio Hughes MD on 2/22/2023        Objective     Past Medical History:   Diagnosis Date   • Acute appendicitis with localized peritonitis 02/20/2023   • Asthma        Past Surgical History:   Procedure Laterality Date   • APPENDECTOMY N/A 2/20/2023    Procedure: APPENDECTOMY LAPAROSCOPIC POSSIBLE OPEN;  Surgeon: Kenroy Armstrong MD;  [place] [facility] [place] [facility] [place]  Service: General;  Laterality: N/A;   • BREAST IMPLANT REMOVAL     • BREAST IMPLANT SURGERY Bilateral    • ELBOW OPEN REDUCTION INTERNAL FIXATION Right    • TUBAL ABDOMINAL LIGATION         Outpatient Medications Marked as Taking for the 3/7/23 encounter (Office Visit) with Melisa Polo APRN   Medication Sig Dispense Refill   • albuterol sulfate  (90 Base) MCG/ACT inhaler Inhale 1-2 puffs Every 4 (Four) Hours As Needed.     • fluticasone (FLONASE) 50 MCG/ACT nasal spray 1 spray by Each Nare route 2 (Two) Times a Day As Needed.     • loratadine (CLARITIN) 10 MG tablet Take 1 tablet by mouth Daily.     • omeprazole (priLOSEC) 40 MG capsule Take 1 capsule by mouth Daily.         Allergies   Allergen Reactions   • Hydrocodone Nausea And Vomiting  "  • Codeine Unknown - Low Severity   • Penicillins Unknown - Low Severity     As a child        Family History   Problem Relation Age of Onset   • No Known Problems Mother    • No Known Problems Father    • Malig Hyperthermia Neg Hx        Social History     Socioeconomic History   • Marital status:    Tobacco Use   • Smoking status: Former     Types: Cigarettes     Quit date: 2013     Years since quitting: 10.1   Vaping Use   • Vaping Use: Never used   Substance and Sexual Activity   • Alcohol use: Not Currently   • Drug use: Never   • Sexual activity: Defer       Review of Systems   Constitutional: Negative for chills and fever.   Gastrointestinal: Negative for abdominal distention, abdominal pain, anal bleeding, blood in stool, constipation, diarrhea and rectal pain.        Vital Signs:   Pulse 77   Ht 162.6 cm (64\")   Wt 78 kg (172 lb)   BMI 29.52 kg/m²      Physical Exam  Vitals and nursing note reviewed.   Constitutional:       General: She is not in acute distress.     Appearance: Normal appearance.   HENT:      Head: Normocephalic.   Cardiovascular:      Rate and Rhythm: Normal rate.   Pulmonary:      Effort: Pulmonary effort is normal.      Breath sounds: No stridor.   Abdominal:      Palpations: Abdomen is soft.      Comments: All laparoscopic incisions are clean dry and intact without erythema.  Staples are present.    Today I removed all staples and applied Steri-Strips.   Musculoskeletal:         General: No deformity. Normal range of motion.   Skin:     General: Skin is warm and dry.      Coloration: Skin is not jaundiced.   Neurological:      General: No focal deficit present.      Mental Status: She is alert and oriented to person, place, and time.   Psychiatric:         Mood and Affect: Mood normal.         Thought Content: Thought content normal.          Result Review :          []  Laboratory  []  Radiology  [x]  Pathology  []  Microbiology  []  EKG/Telemetry   []  Cardiology/Vascular "   [x]  Old records  Today I reviewed Dr. Armstrong's operative and pathology report.     Assessment and Plan    Diagnoses and all orders for this visit:    1. S/P laparoscopic appendectomy (Primary)        Follow Up   Return if symptoms worsen or fail to improve.     Seek medical emergency services:  Fever greater than 101 associated with chills.  Extreme pain.    Patient was given instructions and counseling regarding her condition or for health maintenance advice. Please see specific information pulled into the AVS if appropriate.

## 2023-06-14 ENCOUNTER — APPOINTMENT (OUTPATIENT)
Dept: CT IMAGING | Facility: HOSPITAL | Age: 58
End: 2023-06-14
Payer: COMMERCIAL

## 2023-06-14 LAB
ALBUMIN SERPL-MCNC: 4.2 G/DL (ref 3.5–5.2)
ALBUMIN/GLOB SERPL: 1.5 G/DL
ALP SERPL-CCNC: 98 U/L (ref 39–117)
ALT SERPL W P-5'-P-CCNC: 39 U/L (ref 1–33)
ANION GAP SERPL CALCULATED.3IONS-SCNC: 10.6 MMOL/L (ref 5–15)
AST SERPL-CCNC: 29 U/L (ref 1–32)
BASOPHILS # BLD AUTO: 0.03 10*3/MM3 (ref 0–0.2)
BASOPHILS NFR BLD AUTO: 0.5 % (ref 0–1.5)
BILIRUB SERPL-MCNC: 0.3 MG/DL (ref 0–1.2)
BUN SERPL-MCNC: 15 MG/DL (ref 6–20)
BUN/CREAT SERPL: 20 (ref 7–25)
CALCIUM SPEC-SCNC: 9.6 MG/DL (ref 8.6–10.5)
CHLORIDE SERPL-SCNC: 105 MMOL/L (ref 98–107)
CO2 SERPL-SCNC: 25.4 MMOL/L (ref 22–29)
CREAT SERPL-MCNC: 0.75 MG/DL (ref 0.57–1)
DEPRECATED RDW RBC AUTO: 41.1 FL (ref 37–54)
EGFRCR SERPLBLD CKD-EPI 2021: 92.4 ML/MIN/1.73
EOSINOPHIL # BLD AUTO: 0.22 10*3/MM3 (ref 0–0.4)
EOSINOPHIL NFR BLD AUTO: 3.6 % (ref 0.3–6.2)
ERYTHROCYTE [DISTWIDTH] IN BLOOD BY AUTOMATED COUNT: 12.6 % (ref 12.3–15.4)
GLOBULIN UR ELPH-MCNC: 2.8 GM/DL
GLUCOSE SERPL-MCNC: 175 MG/DL (ref 65–99)
HCT VFR BLD AUTO: 40 % (ref 34–46.6)
HGB BLD-MCNC: 12.9 G/DL (ref 12–15.9)
HOLD SPECIMEN: NORMAL
HOLD SPECIMEN: NORMAL
IMM GRANULOCYTES # BLD AUTO: 0.01 10*3/MM3 (ref 0–0.05)
IMM GRANULOCYTES NFR BLD AUTO: 0.2 % (ref 0–0.5)
LYMPHOCYTES # BLD AUTO: 2.5 10*3/MM3 (ref 0.7–3.1)
LYMPHOCYTES NFR BLD AUTO: 40.5 % (ref 19.6–45.3)
MAGNESIUM SERPL-MCNC: 2 MG/DL (ref 1.6–2.6)
MCH RBC QN AUTO: 29.3 PG (ref 26.6–33)
MCHC RBC AUTO-ENTMCNC: 32.3 G/DL (ref 31.5–35.7)
MCV RBC AUTO: 90.9 FL (ref 79–97)
MONOCYTES # BLD AUTO: 0.43 10*3/MM3 (ref 0.1–0.9)
MONOCYTES NFR BLD AUTO: 7 % (ref 5–12)
NEUTROPHILS NFR BLD AUTO: 2.98 10*3/MM3 (ref 1.7–7)
NEUTROPHILS NFR BLD AUTO: 48.2 % (ref 42.7–76)
NRBC BLD AUTO-RTO: 0 /100 WBC (ref 0–0.2)
PLATELET # BLD AUTO: 250 10*3/MM3 (ref 140–450)
PMV BLD AUTO: 10.7 FL (ref 6–12)
POTASSIUM SERPL-SCNC: 3.7 MMOL/L (ref 3.5–5.2)
PROT SERPL-MCNC: 7 G/DL (ref 6–8.5)
RBC # BLD AUTO: 4.4 10*6/MM3 (ref 3.77–5.28)
SODIUM SERPL-SCNC: 141 MMOL/L (ref 136–145)
TROPONIN T SERPL HS-MCNC: <6 NG/L
WBC NRBC COR # BLD: 6.17 10*3/MM3 (ref 3.4–10.8)
WHOLE BLOOD HOLD COAG: NORMAL
WHOLE BLOOD HOLD SPECIMEN: NORMAL

## 2023-06-14 PROCEDURE — 36415 COLL VENOUS BLD VENIPUNCTURE: CPT

## 2023-06-14 PROCEDURE — 80053 COMPREHEN METABOLIC PANEL: CPT

## 2023-06-14 PROCEDURE — 70450 CT HEAD/BRAIN W/O DYE: CPT

## 2023-06-14 PROCEDURE — 93005 ELECTROCARDIOGRAM TRACING: CPT

## 2023-06-14 PROCEDURE — 93005 ELECTROCARDIOGRAM TRACING: CPT | Performed by: EMERGENCY MEDICINE

## 2023-06-14 PROCEDURE — 83735 ASSAY OF MAGNESIUM: CPT

## 2023-06-14 PROCEDURE — 84484 ASSAY OF TROPONIN QUANT: CPT

## 2023-06-14 PROCEDURE — 85025 COMPLETE CBC W/AUTO DIFF WBC: CPT

## 2023-06-14 RX ORDER — SODIUM CHLORIDE 0.9 % (FLUSH) 0.9 %
10 SYRINGE (ML) INJECTION AS NEEDED
Status: DISCONTINUED | OUTPATIENT
Start: 2023-06-14 | End: 2023-06-15 | Stop reason: HOSPADM

## 2023-06-15 ENCOUNTER — TRANSCRIBE ORDERS (OUTPATIENT)
Dept: ADMINISTRATIVE | Facility: HOSPITAL | Age: 58
End: 2023-06-15
Payer: COMMERCIAL

## 2023-06-15 ENCOUNTER — HOSPITAL ENCOUNTER (EMERGENCY)
Facility: HOSPITAL | Age: 58
Discharge: HOME | End: 2023-06-15
Attending: EMERGENCY MEDICINE
Payer: COMMERCIAL

## 2023-06-15 VITALS
DIASTOLIC BLOOD PRESSURE: 63 MMHG | HEIGHT: 64 IN | SYSTOLIC BLOOD PRESSURE: 103 MMHG | WEIGHT: 171.08 LBS | BODY MASS INDEX: 29.21 KG/M2 | HEART RATE: 76 BPM | OXYGEN SATURATION: 100 % | TEMPERATURE: 98.6 F | RESPIRATION RATE: 18 BRPM

## 2023-06-15 DIAGNOSIS — R55 NEAR SYNCOPE: ICD-10-CM

## 2023-06-15 DIAGNOSIS — R42 DIZZINESS: Primary | ICD-10-CM

## 2023-06-15 DIAGNOSIS — R42 DIZZINESS AND GIDDINESS: Primary | ICD-10-CM

## 2023-06-15 LAB — QT INTERVAL: 388 MS

## 2023-06-15 RX ORDER — MONTELUKAST SODIUM 10 MG/1
10 TABLET ORAL EVERY EVENING
COMMUNITY
Start: 2023-06-12

## 2023-06-15 RX ORDER — ATORVASTATIN CALCIUM 40 MG/1
1 TABLET, FILM COATED ORAL DAILY
COMMUNITY
Start: 2023-06-12

## 2023-06-15 RX ORDER — BUDESONIDE AND FORMOTEROL FUMARATE DIHYDRATE 160; 4.5 UG/1; UG/1
AEROSOL RESPIRATORY (INHALATION)
COMMUNITY
Start: 2023-04-03
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
## 2023-06-30 PROBLEM — N20.0 KIDNEY STONES: Status: ACTIVE | Noted: 2023-06-30

## 2023-06-30 PROBLEM — E78.5 HYPERLIPIDEMIA LDL GOAL <100: Status: ACTIVE | Noted: 2023-06-30

## 2023-06-30 PROBLEM — M19.90 ARTHRITIS: Status: ACTIVE | Noted: 2023-06-30

## 2023-06-30 PROBLEM — K35.80 ACUTE APPENDICITIS: Status: RESOLVED | Noted: 2023-02-20 | Resolved: 2023-06-30

## 2023-06-30 PROBLEM — J45.909 ASTHMA: Status: ACTIVE | Noted: 2023-06-30

## 2023-06-30 PROBLEM — K35.30 ACUTE APPENDICITIS WITH LOCALIZED PERITONITIS: Status: RESOLVED | Noted: 2023-02-20 | Resolved: 2023-06-30

## 2023-06-30 PROBLEM — J30.9 ALLERGIC RHINITIS: Status: ACTIVE | Noted: 2023-06-30

## 2024-05-14 ENCOUNTER — APPOINTMENT (OUTPATIENT)
Dept: CT IMAGING | Facility: HOSPITAL | Age: 59
End: 2024-05-14
Payer: COMMERCIAL

## 2024-05-14 ENCOUNTER — HOSPITAL ENCOUNTER (EMERGENCY)
Facility: HOSPITAL | Age: 59
Discharge: HOME | End: 2024-05-14
Attending: EMERGENCY MEDICINE
Payer: COMMERCIAL

## 2024-05-14 VITALS
SYSTOLIC BLOOD PRESSURE: 98 MMHG | WEIGHT: 162.92 LBS | BODY MASS INDEX: 27.81 KG/M2 | RESPIRATION RATE: 15 BRPM | OXYGEN SATURATION: 93 % | TEMPERATURE: 98 F | HEART RATE: 74 BPM | HEIGHT: 64 IN | DIASTOLIC BLOOD PRESSURE: 66 MMHG

## 2024-05-14 DIAGNOSIS — R74.8 ELEVATED LIPASE: ICD-10-CM

## 2024-05-14 DIAGNOSIS — R79.89 ELEVATED LFTS: ICD-10-CM

## 2024-05-14 DIAGNOSIS — R10.30 LOWER ABDOMINAL PAIN: ICD-10-CM

## 2024-05-14 DIAGNOSIS — K76.0 HEPATIC STEATOSIS: Primary | ICD-10-CM

## 2024-05-14 LAB
ALBUMIN SERPL-MCNC: 3.5 G/DL (ref 3.5–5.2)
ALBUMIN/GLOB SERPL: 1.3 G/DL
ALP SERPL-CCNC: 61 U/L (ref 39–117)
ALT SERPL W P-5'-P-CCNC: 132 U/L (ref 1–33)
ANION GAP SERPL CALCULATED.3IONS-SCNC: 7.4 MMOL/L (ref 5–15)
ANISOCYTOSIS BLD QL: NORMAL
AST SERPL-CCNC: 136 U/L (ref 1–32)
BACTERIA UR QL AUTO: ABNORMAL /HPF
BASOPHILS # BLD AUTO: 0 10*3/MM3 (ref 0–0.2)
BASOPHILS NFR BLD AUTO: 0 % (ref 0–1.5)
BILIRUB SERPL-MCNC: 0.2 MG/DL (ref 0–1.2)
BILIRUB UR QL STRIP: NEGATIVE
BUN SERPL-MCNC: 9 MG/DL (ref 6–20)
BUN/CREAT SERPL: 15.3 (ref 7–25)
CALCIUM SPEC-SCNC: 8.4 MG/DL (ref 8.6–10.5)
CHLORIDE SERPL-SCNC: 101 MMOL/L (ref 98–107)
CLARITY UR: CLEAR
CO2 SERPL-SCNC: 26.6 MMOL/L (ref 22–29)
COLOR UR: YELLOW
CREAT SERPL-MCNC: 0.59 MG/DL (ref 0.57–1)
D-LACTATE SERPL-SCNC: 0.7 MMOL/L (ref 0.5–2)
DEPRECATED RDW RBC AUTO: 42.3 FL (ref 37–54)
EGFRCR SERPLBLD CKD-EPI 2021: 104 ML/MIN/1.73
EOSINOPHIL # BLD AUTO: 0 10*3/MM3 (ref 0–0.4)
EOSINOPHIL NFR BLD AUTO: 0 % (ref 0.3–6.2)
ERYTHROCYTE [DISTWIDTH] IN BLOOD BY AUTOMATED COUNT: 13.1 % (ref 12.3–15.4)
GLOBULIN UR ELPH-MCNC: 2.7 GM/DL
GLUCOSE SERPL-MCNC: 115 MG/DL (ref 65–99)
GLUCOSE UR STRIP-MCNC: NEGATIVE MG/DL
HCT VFR BLD AUTO: 37.8 % (ref 34–46.6)
HGB BLD-MCNC: 12.3 G/DL (ref 12–15.9)
HGB UR QL STRIP.AUTO: NEGATIVE
HOLD SPECIMEN: NORMAL
HOLD SPECIMEN: NORMAL
HYALINE CASTS UR QL AUTO: ABNORMAL /LPF
IMM GRANULOCYTES # BLD AUTO: 0.01 10*3/MM3 (ref 0–0.05)
IMM GRANULOCYTES NFR BLD AUTO: 0.4 % (ref 0–0.5)
KETONES UR QL STRIP: ABNORMAL
LARGE PLATELETS: NORMAL
LEUKOCYTE ESTERASE UR QL STRIP.AUTO: NEGATIVE
LIPASE SERPL-CCNC: 84 U/L (ref 13–60)
LYMPHOCYTES # BLD AUTO: 1.58 10*3/MM3 (ref 0.7–3.1)
LYMPHOCYTES NFR BLD AUTO: 55.4 % (ref 19.6–45.3)
MCH RBC QN AUTO: 28.9 PG (ref 26.6–33)
MCHC RBC AUTO-ENTMCNC: 32.5 G/DL (ref 31.5–35.7)
MCV RBC AUTO: 88.7 FL (ref 79–97)
MONOCYTES # BLD AUTO: 0.18 10*3/MM3 (ref 0.1–0.9)
MONOCYTES NFR BLD AUTO: 6.3 % (ref 5–12)
NEUTROPHILS NFR BLD AUTO: 1.08 10*3/MM3 (ref 1.7–7)
NEUTROPHILS NFR BLD AUTO: 37.9 % (ref 42.7–76)
NITRITE UR QL STRIP: NEGATIVE
NRBC BLD AUTO-RTO: 0 /100 WBC (ref 0–0.2)
PH UR STRIP.AUTO: 6 [PH] (ref 5–8)
PLATELET # BLD AUTO: 134 10*3/MM3 (ref 140–450)
PMV BLD AUTO: 11.4 FL (ref 6–12)
POTASSIUM SERPL-SCNC: 3.9 MMOL/L (ref 3.5–5.2)
PROT SERPL-MCNC: 6.2 G/DL (ref 6–8.5)
PROT UR QL STRIP: ABNORMAL
RBC # BLD AUTO: 4.26 10*6/MM3 (ref 3.77–5.28)
RBC # UR STRIP: ABNORMAL /HPF
REF LAB TEST METHOD: ABNORMAL
SMALL PLATELETS BLD QL SMEAR: NORMAL
SMUDGE CELLS BLD QL SMEAR: NORMAL
SODIUM SERPL-SCNC: 135 MMOL/L (ref 136–145)
SP GR UR STRIP: 1.02 (ref 1–1.03)
SQUAMOUS #/AREA URNS HPF: ABNORMAL /HPF
UROBILINOGEN UR QL STRIP: ABNORMAL
WBC # UR STRIP: ABNORMAL /HPF
WBC NRBC COR # BLD AUTO: 2.85 10*3/MM3 (ref 3.4–10.8)
WHOLE BLOOD HOLD COAG: NORMAL
WHOLE BLOOD HOLD SPECIMEN: NORMAL

## 2024-05-14 PROCEDURE — 81001 URINALYSIS AUTO W/SCOPE: CPT | Performed by: EMERGENCY MEDICINE

## 2024-05-14 PROCEDURE — 85025 COMPLETE CBC W/AUTO DIFF WBC: CPT | Performed by: EMERGENCY MEDICINE

## 2024-05-14 PROCEDURE — 96374 THER/PROPH/DIAG INJ IV PUSH: CPT

## 2024-05-14 PROCEDURE — 25010000002 ONDANSETRON PER 1 MG

## 2024-05-14 PROCEDURE — 80053 COMPREHEN METABOLIC PANEL: CPT | Performed by: EMERGENCY MEDICINE

## 2024-05-14 PROCEDURE — 99285 EMERGENCY DEPT VISIT HI MDM: CPT

## 2024-05-14 PROCEDURE — 96375 TX/PRO/DX INJ NEW DRUG ADDON: CPT

## 2024-05-14 PROCEDURE — 25510000001 IOPAMIDOL PER 1 ML: Performed by: EMERGENCY MEDICINE

## 2024-05-14 PROCEDURE — 85007 BL SMEAR W/DIFF WBC COUNT: CPT | Performed by: EMERGENCY MEDICINE

## 2024-05-14 PROCEDURE — 83605 ASSAY OF LACTIC ACID: CPT | Performed by: EMERGENCY MEDICINE

## 2024-05-14 PROCEDURE — 25010000002 KETOROLAC TROMETHAMINE PER 15 MG

## 2024-05-14 PROCEDURE — 83690 ASSAY OF LIPASE: CPT | Performed by: EMERGENCY MEDICINE

## 2024-05-14 PROCEDURE — 74177 CT ABD & PELVIS W/CONTRAST: CPT

## 2024-05-14 RX ORDER — SODIUM CHLORIDE 0.9 % (FLUSH) 0.9 %
10 SYRINGE (ML) INJECTION AS NEEDED
Status: DISCONTINUED | OUTPATIENT
Start: 2024-05-14 | End: 2024-05-14 | Stop reason: HOSPADM

## 2024-05-14 RX ORDER — ONDANSETRON 4 MG/1
4 TABLET, ORALLY DISINTEGRATING ORAL 4 TIMES DAILY PRN
Qty: 15 TABLET | Refills: 0 | Status: SHIPPED | OUTPATIENT
Start: 2024-05-14

## 2024-05-14 RX ORDER — DICYCLOMINE HCL 20 MG
20 TABLET ORAL EVERY 6 HOURS
Qty: 20 TABLET | Refills: 0 | Status: SHIPPED | OUTPATIENT
Start: 2024-05-14

## 2024-05-14 RX ORDER — ONDANSETRON 2 MG/ML
4 INJECTION INTRAMUSCULAR; INTRAVENOUS ONCE
Status: COMPLETED | OUTPATIENT
Start: 2024-05-14 | End: 2024-05-14

## 2024-05-14 RX ORDER — KETOROLAC TROMETHAMINE 30 MG/ML
30 INJECTION, SOLUTION INTRAMUSCULAR; INTRAVENOUS ONCE
Status: COMPLETED | OUTPATIENT
Start: 2024-05-14 | End: 2024-05-14

## 2024-05-14 RX ADMIN — ONDANSETRON 4 MG: 2 INJECTION INTRAMUSCULAR; INTRAVENOUS at 17:14

## 2024-05-14 RX ADMIN — KETOROLAC TROMETHAMINE 30 MG: 30 INJECTION, SOLUTION INTRAMUSCULAR; INTRAVENOUS at 17:14

## 2024-05-14 RX ADMIN — IOPAMIDOL 90 ML: 755 INJECTION, SOLUTION INTRAVENOUS at 18:18

## 2024-05-14 NOTE — DISCHARGE INSTRUCTIONS
Your lab work shows that you have mildly elevated lipase and elevated liver enzymes  CT shows that you have a fatty liver otherwise negative  I have sent Zofran for nausea and vomiting along with Bentyl for abdominal pain  Urine was negative for UTI    I have sent in referral for gastroenterology, they should call you to schedule follow-up appointment

## 2024-05-14 NOTE — ED PROVIDER NOTES
Time: 4:19 PM EDT  Date of encounter:  5/14/2024  Independent Historian/Clinical History and Information was obtained by:   Patient    History is limited by: N/A    Chief Complaint   Patient presents with    Abdominal Pain     Patient reports lower abdominal pain, back pain, nausea, diarrhea x several weeks. Patient was seen at  today and referred to ED for CT scan of abdomen. Patient reports feeling weak and dehydrated.          History of Present Illness:  Patient is a 59 y.o. year old female who presents to the emergency department for evaluation of lower abdominal pain intermittent for the past couple months.  Patient states that she has been having chills and subjective fever.  She has never taken her temperature.  Patient states that she had 2 episodes of vomiting yesterday and multiple episodes of diarrhea.  She states that this Thursday she has an appointment with uro-gynecologist.  Patient states she was seen at urgent care [facility] and was told that she had a lot of protein in the urine.  Denies dysuria and hematuria, recent travel, exposure to illness or suspicious food intake.  Patient states she has been having foul-smelling odor.  Denies vaginal discharge.    Patient Care Team  Primary Care Provider: Kendal Blackmon APRN    Past Medical History:     Allergies   Allergen Reactions    Hydrocodone Nausea And Vomiting    Codeine Unknown - Low Severity    Penicillins Unknown - Low Severity     As a child     Past Medical History:   Diagnosis Date    Acute appendicitis with localized peritonitis 02/20/2023    Asthma     Hyperlipidemia LDL goal <100 06/30/2023     Past Surgical History:   Procedure Laterality Date    APPENDECTOMY N/A 2/20/2023    Procedure: APPENDECTOMY LAPAROSCOPIC POSSIBLE OPEN;  Surgeon: Kenroy Armstrong MD;  Location: [facility];  Service: General;  Laterality: N/A;    BREAST IMPLANT REMOVAL      BREAST IMPLANT SURGERY Bilateral     ELBOW OPEN REDUCTION INTERNAL FIXATION Right      TUBAL ABDOMINAL LIGATION       Family History   Problem Relation Age of Onset    No Known Problems Mother     No Known Problems Father     [place] Hyperthermia Neg Hx        Home Medications:  Prior to Admission medications    Medication Sig Start Date End Date Taking? Authorizing Provider   albuterol sulfate  (90 Base) MCG/ACT inhaler Inhale 1-2 puffs Every 4 (Four) Hours As Needed. 23   Paul Prescott MD   atorvastatin (LIPITOR) 40 MG tablet Take 1 tablet by mouth Daily. 23   Paul Prescott MD   budesonide-formoterol (SYMBICORT) 160-4.5 MCG/ACT inhaler INHALE 2 puffs BY MOUTH TWICE DAILY IN THE MORNING AND IN THE EVENING RINSE MOUTH AFTER USE 4/3/23   Paul Prescott MD   fluticasone (FLONASE) 50 MCG/ACT nasal spray 1 spray by Each Nare route 2 (Two) Times a Day As Needed. 23   Paul Prescott MD   loratadine (CLARITIN) 10 MG tablet Take 1 tablet by mouth Daily. 23   Paul Prescott MD   montelukast (SINGULAIR) 10 MG tablet Take 1 tablet by mouth Every Evening. 23   Paul Prescott MD   omeprazole (priLOSEC) 40 MG capsule Take 1 capsule by mouth Daily.    Paul Prescott MD        Social History:   Social History     Tobacco Use    Smoking status: Former     Current packs/day: 0.00     Types: Cigarettes     Quit date:      Years since quittin.3   Vaping Use    Vaping status: Never Used   Substance Use Topics    Alcohol use: Not Currently    Drug use: Never         Review of Systems:  Review of Systems   Constitutional: Negative.    HENT: Negative.     Eyes: Negative.    Respiratory: Negative.     Cardiovascular: Negative.    Gastrointestinal: Negative.  Positive for abdominal pain, diarrhea, nausea and vomiting.   Endocrine: Negative.    Genitourinary: Negative.    Musculoskeletal: Negative.    Skin: Negative.    Allergic/Immunologic: Negative.    Neurological: Negative.    Hematological: Negative.    Psychiatric/Behavioral: Negative.  "         Physical Exam:  BP 95/74   Pulse 71   Temp 98 °F (36.7 °C) (Oral)   Resp 15   Ht 162.6 cm (64\")   Wt 73.9 kg (162 lb 14.7 oz)   SpO2 93%   BMI 27.97 kg/m²         Physical Exam  Vitals and nursing note reviewed.   Constitutional:       Appearance: Normal appearance.   HENT:      Head: Normocephalic and atraumatic.      Nose: Nose normal.      Mouth/Throat:      Mouth: Mucous membranes are moist.   Eyes:      Extraocular Movements: Extraocular movements intact.      Conjunctiva/sclera: Conjunctivae normal.      Pupils: Pupils are equal, round, and reactive to light.   Cardiovascular:      Rate and Rhythm: Normal rate and regular rhythm.      Heart sounds: Normal heart sounds.   Pulmonary:      Effort: Pulmonary effort is normal.      Breath sounds: Normal breath sounds.   Abdominal:      General: Abdomen is flat. Bowel sounds are normal. There is no distension.      Palpations: Abdomen is soft.      Tenderness: There is abdominal tenderness. There is no right CVA tenderness, left CVA tenderness, guarding or rebound.   Musculoskeletal:         General: Normal range of motion.      Cervical back: Normal range of motion and neck supple.   Skin:     General: Skin is warm and dry.   Neurological:      General: No focal deficit present.      Mental Status: She is alert and oriented to person, place, and time.   Psychiatric:         Mood and Affect: Mood normal.         Behavior: Behavior normal.                 Procedures:  Procedures      Medical Decision Making:      Comorbidities that affect care:    Asthma    External Notes reviewed:    Previous Clinic Note: Urgent care visit from earlier today for left lower quadrant abdominal pain      The following orders were placed and all results were independently analyzed by me:  Orders Placed This Encounter   Procedures    CT Abdomen Pelvis With Contrast    [place] Draw    Comprehensive Metabolic Panel    Lipase    Urinalysis With Microscopic If Indicated (No " Culture) - Urine, Clean Catch    Lactic Acid, Plasma    CBC Auto Differential    Urinalysis, Microscopic Only - Urine, Clean Catch    Scan Slide    Ambulatory Referral to Gastroenterology    NPO Diet NPO Type: Strict NPO    Undress & Gown    Insert Peripheral IV    CBC & Differential    Green Top (Gel)    Lavender Top    Gold Top - SST    Light Blue Top       Medications Given in the Emergency Department:  Medications   sodium chloride 0.9 % flush 10 mL (has no administration in time range)   ondansetron (ZOFRAN) injection 4 mg (4 mg Intravenous Given 5/14/24 1714)   ketorolac (TORADOL) injection 30 mg (30 mg Intravenous Given 5/14/24 1714)   iopamidol (ISOVUE-370) 76 % injection 100 mL (90 mL Intravenous Given 5/14/24 1818)        ED Course:    The patient was initially evaluated in the triage area where orders were placed. The patient was later dispositioned by River Patel PA-C.      The patient was advised to stay for completion of workup which includes but is not limited to communication of labs and radiological results, reassessment and plan. The patient was advised that leaving prior to disposition by a provider could result in critical findings that are not communicated to the patient.     ED Course as of 05/14/24 1935   Tue May 14, 2024   1933 CT showing hepatic steatosis [AJ]      ED Course User Index  [AJ] River Patel PA-C       Labs:    Lab Results (last 24 hours)       Procedure Component Value Units Date/Time    POCT URINALYSIS DIPSTICK, AUTOMATED [[id number]]  (Abnormal) Collected: 05/14/24 1521    Specimen: Urine Updated: 05/14/24 1555     Color, UA Orange     Appearance, Fluid Cloudy     Specific Gravity, UA 1.020     pH, UA 5     Leukocytes, UA 25 Analy/ul     Nitrite, UA Negative     Total Protein, urine 30 mg/dL     Glucose Normal     External Ketones, Urine Negative     Urobilinogen, UA Normal mg/dL      External Bilirubin, Urine 1 mg/dL     Blood, UA Negative     QC Acceptable      Lot Number [id number]     Expiration Date 08/31/2024     Comment --    Urinalysis With Microscopic If Indicated (No Culture) - Urine, Clean Catch [[id number]]  (Abnormal) Collected: 05/14/24 1644    Specimen: Urine, Clean Catch Updated: 05/14/24 1719     Color, UA Yellow     Appearance, UA Clear     pH, UA 6.0     Specific Gravity, UA 1.022     Glucose, UA Negative     Ketones, UA Trace     Bilirubin, UA Negative     Blood, UA Negative     Protein, UA 30 mg/dL (1+)     Leuk Esterase, UA Negative     Nitrite, UA Negative     Urobilinogen, UA 1.0 E.U./dL    Urinalysis, Microscopic Only - Urine, Clean Catch [[id number]]  (Abnormal) Collected: 05/14/24 1644    Specimen: Urine, Clean Catch Updated: 05/14/24 1719     RBC, UA 0-2 /HPF      WBC, UA 3-5 /HPF      Bacteria, UA None Seen /HPF      Squamous Epithelial Cells, UA 7-12 /HPF      Hyaline Casts, UA 3-6 /LPF      Methodology Automated Microscopy    CBC & Differential [[id number]]  (Abnormal) Collected: 05/14/24 1718    Specimen: Blood Updated: 05/14/24 1846    Narrative:      The following orders were created for panel order CBC & Differential.  Procedure                               Abnormality         Status                     ---------                               -----------         ------                     CBC Auto Differential[[id number]]        Abnormal            Final result               Scan Slide[[id number]]                                       Final result                 Please view results for these tests on the individual orders.    Comprehensive Metabolic Panel [[id number]]  (Abnormal) Collected: 05/14/24 1718    Specimen: Blood Updated: 05/14/24 1755     Glucose 115 mg/dL      BUN 9 mg/dL      Creatinine 0.59 mg/dL      Sodium 135 mmol/L      Potassium 3.9 mmol/L      Comment: Slight hemolysis detected by analyzer. Result may be falsely elevated.        Chloride 101 mmol/L      CO2 26.6 mmol/L      Calcium 8.4 mg/dL      Total Protein 6.2 g/dL       Albumin 3.5 g/dL      ALT (SGPT) 132 U/L      AST (SGOT) 136 U/L      Alkaline Phosphatase 61 U/L      Total Bilirubin 0.2 mg/dL      Globulin 2.7 gm/dL      A/G Ratio 1.3 g/dL      BUN/Creatinine Ratio 15.3     Anion Gap 7.4 mmol/L      eGFR 104.0 mL/min/1.73     Narrative:      GFR Normal >60  Chronic Kidney Disease <60  Kidney Failure <15      Lipase [[id number]]  (Abnormal) Collected: 05/14/24 1718    Specimen: Blood Updated: 05/14/24 1755     Lipase 84 U/L     Lactic Acid, Plasma [[id number]]  (Normal) Collected: 05/14/24 1718    Specimen: Blood Updated: 05/14/24 1752     Lactate 0.7 mmol/L     CBC Auto Differential [[id number]]  (Abnormal) Collected: 05/14/24 1718    Specimen: Blood Updated: 05/14/24 1846     WBC 2.85 10*3/mm3      RBC 4.26 10*6/mm3      Hemoglobin 12.3 g/dL      Hematocrit 37.8 %      MCV 88.7 fL      MCH 28.9 pg      MCHC 32.5 g/dL      RDW 13.1 %      RDW-SD 42.3 fl      MPV 11.4 fL      Platelets 134 10*3/mm3      Neutrophil % 37.9 %      Lymphocyte % 55.4 %      Monocyte % 6.3 %      Eosinophil % 0.0 %      Basophil % 0.0 %      Immature Grans % 0.4 %      Neutrophils, Absolute 1.08 10*3/mm3      Lymphocytes, Absolute 1.58 10*3/mm3      Monocytes, Absolute 0.18 10*3/mm3      Eosinophils, Absolute 0.00 10*3/mm3      Basophils, Absolute 0.00 10*3/mm3      Immature Grans, Absolute 0.01 10*3/mm3      nRBC 0.0 /100 WBC     Scan Slide [[id number]] Collected: 05/14/24 1718    Specimen: Blood Updated: 05/14/24 1846     Anisocytosis Slight/1+     Smudge Cells Slight/1+     Platelet Estimate Decreased     Large Platelets Slight/1+             Imaging:    CT Abdomen Pelvis With Contrast    Result Date: 5/14/2024  CT ABDOMEN PELVIS W CONTRAST-  Date of Exam: 5/14/2024 6:08 PM  Indication: lower abd pain/n/v/d.  Comparison: February 20, 2023  Technique: Axial CT images were obtained of the abdomen and pelvis following the uneventful intravenous administration of 90 mL Isovue-370. Reconstructed coronal and  sagittal images were also obtained. Automated exposure control and iterative construction methods were used.   Findings: Within the lung bases is minimal left basilar atelectasis.  There is hepatic steatosis. The gallbladder, adrenal glands, kidneys, spleen, and pancreas are unremarkable.  The stomach appears normal. The small bowel appears normal in caliber and configuration. The colon appears normal. The appendix is surgically absent. There is no ascites or loculated collection. No abnormally enlarged lymph nodes are identified.  The rectum, uterus, and urinary bladder are unremarkable.  No aggressive osseous lesions are identified.      Impression: 1.  No acute process identified within abdomen/pelvis. 2.  Hepatic steatosis.    Electronically Signed By-Sang Bergeron MD On:5/14/2024 7:07 PM         Differential Diagnosis and Discussion:      Abdominal Pain: Based on the patient's signs and symptoms, I considered abdominal aortic aneurysm, small bowel obstruction, pancreatitis, acute cholecystitis, acute appendecitis, peptic ulcer disease, gastritis, colitis, endocrine disorders, irritable bowel syndrome and other differential diagnosis an etiology of the patient's abdominal pain.  Diarrhea: Differential diagnosis includes but is not limited to malabsorption syndrome, bacterial infection, carcinoid syndrome, pancreatic hypersecretion, viral infection, celiac sprue, Crohn's disease, ulcerative colitis, ischemic colitis, colitis, hypermotility, and irritable bowel syndrome.  Vomiting: Differential diagnosis includes but is not limited to migraine, labyrinthine disorders, psychogenic, metabolic and endocrine causes, peptic ulcer, gastric outlet obstruction, gastritis, gastroenteritis, appendicitis, intestinal obstruction, paralytic ileus, food poisoning, cholecystitis, acute hepatitis, acute pancreatitis, acute febrile illness, and myocardial infarction.        MDM     Amount and/or Complexity of Data  Reviewed  Clinical lab tests: reviewed  Decide to obtain previous medical records or to obtain history from someone other than the patient: yes                 Patient Care Considerations:          Consultants/Shared Management Plan:        Social Determinants of Health:          Disposition and Care Coordination:            Final diagnoses:   Hepatic steatosis   Lower abdominal pain   Elevated LFTs   Elevated lipase        ED Disposition       ED Disposition   Discharge    Condition   Stable    Comment   --               This medical record created using voice recognition software.             River Patel PA-C  05/14/24 1937

## 2024-05-20 ENCOUNTER — TRANSCRIBE ORDERS (OUTPATIENT)
Dept: ADMINISTRATIVE | Facility: HOSPITAL | Age: 59
End: 2024-05-20
Payer: COMMERCIAL

## 2024-05-20 DIAGNOSIS — R10.10 PAIN OF UPPER ABDOMEN: Primary | ICD-10-CM

## 2024-05-21 ENCOUNTER — HOSPITAL ENCOUNTER (OUTPATIENT)
Dept: ULTRASOUND IMAGING | Facility: HOSPITAL | Age: 59
Discharge: HOME | End: 2024-05-21
Payer: COMMERCIAL

## 2024-05-21 DIAGNOSIS — R10.10 PAIN OF UPPER ABDOMEN: ICD-10-CM

## 2024-05-21 PROCEDURE — 76705 ECHO EXAM OF ABDOMEN: CPT

## 2024-11-12 ENCOUNTER — OFFICE VISIT (OUTPATIENT)
Dept: CARDIOLOGY | Facility: CLINIC | Age: 59
End: 2024-11-12
Payer: COMMERCIAL

## 2024-11-12 VITALS
WEIGHT: 162 LBS | BODY MASS INDEX: 37.49 KG/M2 | DIASTOLIC BLOOD PRESSURE: 78 MMHG | HEART RATE: 78 BPM | HEIGHT: 55 IN | SYSTOLIC BLOOD PRESSURE: 122 MMHG

## 2024-11-12 DIAGNOSIS — R00.2 PALPITATIONS: ICD-10-CM

## 2024-11-12 DIAGNOSIS — E78.2 MIXED HYPERLIPIDEMIA: ICD-10-CM

## 2024-11-12 DIAGNOSIS — R06.09 EXERTIONAL DYSPNEA: Primary | ICD-10-CM

## 2024-11-12 DIAGNOSIS — R55 SYNCOPE AND COLLAPSE: ICD-10-CM

## 2024-11-12 PROCEDURE — 93000 ELECTROCARDIOGRAM COMPLETE: CPT | Performed by: INTERNAL MEDICINE

## 2024-11-12 PROCEDURE — 99204 OFFICE O/P NEW MOD 45 MIN: CPT | Performed by: INTERNAL MEDICINE

## 2024-11-12 RX ORDER — METOPROLOL SUCCINATE 25 MG/1
25 TABLET, EXTENDED RELEASE ORAL
Qty: 30 TABLET | Refills: 3 | Status: SHIPPED | OUTPATIENT
Start: 2024-11-12

## 2024-11-12 NOTE — PROGRESS NOTES
CARDIOLOGY INITIAL CONSULT       Chief Complaint  Loss of Consciousness    Subjective            Elvia Joyce presents to [facility] CARDIOLOGY  History of Present Illness  The patient comes for evaluation after previous episode of syncope.  The patient reports that was driving her car and felt some fluttering in the chest and fainted.  She reports exertional dyspnea and intermittent episode of chest discomfort for about a year.  She also reports palpitations of sudden onset and termination.  Her EKG showed normal sinus rhythm with repolarization abnormalities which is unchanged when compared with the previous EKG from 2023.  The patient denies recent episodes of syncope.       Past History:    Medical History:  Past Medical History:   Diagnosis Date    Acute appendicitis with localized peritonitis 02/20/2023    Asthma     Hyperlipidemia LDL goal <100 06/30/2023       Surgical History: has a past surgical history that includes Breast Implant Revision (Bilateral); Breast implant removal; Tubal ligation; Elbow fracture surgery (Right); and Appendectomy (N/A, 2/20/2023).     Family History: family history includes No Known Problems in her father and mother.     Social History: reports that she quit smoking about 11 years ago. Her smoking use included cigarettes. She does not have any smokeless tobacco history on file. She reports that she does not currently use alcohol. She reports that she does not use drugs.    Allergies: Hydrocodone, Codeine, and Penicillins    Current Outpatient Medications on File Prior to Visit   Medication Sig    ESTROGENS CONJ SYNTHETIC A PO Take 1 patch by mouth 1 (One) Time Per Week.    albuterol sulfate  (90 Base) MCG/ACT inhaler Inhale 1-2 puffs Every 4 (Four) Hours As Needed. (Patient not taking: Reported on 11/12/2024)    atorvastatin (LIPITOR) 40 MG tablet Take 1 tablet by mouth Daily. (Patient not taking: Reported on 11/12/2024)     "budesonide-formoterol (SYMBICORT) 160-4.5 MCG/ACT inhaler INHALE 2 puffs BY MOUTH TWICE DAILY IN THE MORNING AND IN THE EVENING RINSE MOUTH AFTER USE (Patient not taking: Reported on 11/12/2024)    dicyclomine (BENTYL) 20 MG tablet Take 1 tablet by mouth Every 6 (Six) Hours. (Patient not taking: Reported on 11/12/2024)    estrogens, conjugated, (PREMARIN) 0.3 MG tablet Take 1 tablet by mouth Daily. Take daily for 21 days then do not take for 7 days.    fluticasone (FLONASE) 50 MCG/ACT nasal spray 1 spray by Each Nare route 2 (Two) Times a Day As Needed. (Patient not taking: Reported on 11/12/2024)    loratadine (CLARITIN) 10 MG tablet Take 1 tablet by mouth Daily. (Patient not taking: Reported on 11/12/2024)    montelukast (SINGULAIR) 10 MG tablet Take 1 tablet by mouth Every Evening. (Patient not taking: Reported on 11/12/2024)    omeprazole (priLOSEC) 40 MG capsule Take 1 capsule by mouth Daily. (Patient not taking: Reported on 11/12/2024)    ondansetron ODT (ZOFRAN-ODT) 4 MG disintegrating tablet Take 1 tablet by mouth 4 (Four) Times a Day As Needed for Nausea or Vomiting. (Patient not taking: Reported on 11/12/2024)     No current facility-administered medications on file prior to visit.          Review of Systems : All systems were reviewed and negative save for syncope, palpitations and exertional    Objective     /78 (BP Location: Left arm)   Pulse 78   Ht 64 cm (25.2\")   Wt 73.5 kg (162 lb)   .40 kg/m²       Physical Exam    Alert, oriented  Neck: No JVD, no bruits  Heart. Regular, no gallops, no rubs.  Lungs: No rales, no wheezing.   Abdomen: soft, bs+  LE: No edema  Neurologic. No motor deficits.   No orthostatic changes.   Result Review :     The following data was reviewed by: Avtar Hercules MD on 11/12/2024:    CMP          5/14/2024    17:18   CMP   Glucose 115    BUN 9    Creatinine 0.59    EGFR 104.0    Sodium 135    Potassium 3.9    Chloride 101    Calcium 8.4    Total Protein 6.2  "   Albumin 3.5    Globulin 2.7    Total Bilirubin 0.2    Alkaline Phosphatase 61    AST (SGOT) 136    ALT (SGPT) 132    Albumin/Globulin Ratio 1.3    BUN/Creatinine Ratio 15.3    Anion Gap 7.4      CBC          5/14/2024    17:18   CBC   WBC 2.85    RBC 4.26    Hemoglobin 12.3    Hematocrit 37.8    MCV 88.7    MCH 28.9    MCHC 32.5    RDW 13.1    Platelets 134             Data reviewed: Cardiology studies              ECG 12 Lead    Date/Time: 11/12/2024 1:43 PM  Performed by: Avtar Nash MD    Authorized by: Avtar Nash MD  Comparison: compared with previous ECG   Similar to previous ECG  Rhythm: sinus rhythm  Other findings: non-specific ST-T wave changes              Assessment and Plan        Diagnoses and all orders for this visit:    1. Exertional dyspnea (Primary)  -     Adult Transthoracic Echo Complete W/ Cont if Necessary Per Protocol; Future  -     Stress Test With Myocardial Perfusion One Day; Future    2. Palpitations  -     Holter Monitor - 72 Hour Up To 15 Days; Future    3. Mixed hyperlipidemia    4. Syncope and collapse    Other orders  -     metoprolol succinate XL (TOPROL-XL) 25 MG 24 hr tablet; Take 1 tablet by mouth every night at bedtime.  Dispense: 30 tablet; Refill: 3        Patient has major risk factors for coronary artery disease and some coronary calcifications on previous CT.  I would resume the statin therapy for goal LDL is under 70.  I would start metoprolol XL 25 minutes oral at bedtime.  I will proceed with a cardiac monitor for 7 days to evaluate for her palpitations.  The patient will be scheduled for for a 2D echo to assess cardiac function and wall motion.  She will be also scheduled for an exercise nuclear stress test to evaluate for obstructive coronary artery disease and rule out the presence of ischemia.  Continue with heart healthy lifestyle and diet.  Advised to walk at least 45 minutes/day 5 times per week.  Instructed to avoid driving until we have a complete  workup.    I spent 45 minutes caring for Elvia on this date of service. This time includes time spent by me in the following activities:reviewing tests, obtaining and/or reviewing a separately obtained history, performing a medically appropriate examination and/or evaluation , ordering medications, tests, or procedures, and documenting information in the medical record.    Elvia Joyce  reports that she quit smoking about 11 years ago. Her smoking use included cigarettes. She does not have any smokeless tobacco history on file. I have educated her on the risk of diseases from using tobacco products such as cancer, COPD and heart disease.     I advised her to quit and she is no longer a tobacco user and has quit.    I spend 5 minutes on counseling the patient.    Follow Up     Return for [facility], After testing.    Patient was given instructions and counseling regarding her condition or for health maintenance advice. Please see specific information pulled into the AVS if appropriate.

## 2024-11-13 ENCOUNTER — TELEPHONE (OUTPATIENT)
Dept: CARDIOLOGY | Facility: CLINIC | Age: 59
End: 2024-11-13

## 2024-11-13 NOTE — TELEPHONE ENCOUNTER
Caller: Elvia Joyce    Relationship: Self    Best call back number: [phone]     Which medication are you concerned about: METOPROLOL    Who prescribed you this medication:     When did you start taking this medication: HAS NOT STARTED    What are your concerns:  PRESCRIBED METOPROLOL ON 11.12.24. THE PATIENT IS HESTIANT TO TAKE IT AS SHE HAS ASTHMA AND MID/LOW BLOOD PRESSURE.

## 2024-11-18 NOTE — TELEPHONE ENCOUNTER
Called and spoke with patient regarding concerns about taking metoprolol.  Explained that it is a cardioselective form of beta-blocker, and generally low doses do not impact patients with asthma.  This is usually worsened for patients who have uncontrolled asthma at baseline, are on higher doses of beta-blocker.  However if she has any concerns or exacerbation of asthma after starting medication we can revisit.  Also encouraged her if she has concerns related to her blood pressure to start out with half tablet nightly for the first week, and then titrate up to the full tablet as long as she is tolerating well.  She has any concerns after starting medications we can revisit.

## 2025-06-26 ENCOUNTER — TRANSCRIBE ORDERS (OUTPATIENT)
Dept: ADMINISTRATIVE | Facility: HOSPITAL | Age: 60
End: 2025-06-26
Payer: COMMERCIAL

## 2025-06-26 DIAGNOSIS — Z87.891 HISTORY OF CIGARETTE SMOKING: Primary | ICD-10-CM

## 2025-06-26 DIAGNOSIS — Z12.31 OTHER SCREENING MAMMOGRAM: Primary | ICD-10-CM

## 2025-07-08 ENCOUNTER — HOSPITAL ENCOUNTER (OUTPATIENT)
Dept: MAMMOGRAPHY | Facility: HOSPITAL | Age: 60
Discharge: HOME | End: 2025-07-08
Payer: COMMERCIAL

## 2025-07-08 ENCOUNTER — HOSPITAL ENCOUNTER (OUTPATIENT)
Dept: CT IMAGING | Facility: HOSPITAL | Age: 60
Discharge: HOME | End: 2025-07-08
Payer: COMMERCIAL

## 2025-07-08 DIAGNOSIS — Z12.31 OTHER SCREENING MAMMOGRAM: ICD-10-CM

## 2025-07-08 DIAGNOSIS — Z87.891 HISTORY OF CIGARETTE SMOKING: ICD-10-CM

## 2025-07-08 PROCEDURE — 77063 BREAST TOMOSYNTHESIS BI: CPT

## 2025-07-08 PROCEDURE — 77067 SCR MAMMO BI INCL CAD: CPT

## 2025-07-08 PROCEDURE — 71271 CT THORAX LUNG CANCER SCR C-: CPT

## 2025-07-15 ENCOUNTER — OFFICE VISIT (OUTPATIENT)
Dept: PULMONOLOGY | Facility: CLINIC | Age: 60
End: 2025-07-15
Payer: COMMERCIAL

## 2025-07-15 VITALS
RESPIRATION RATE: 16 BRPM | HEART RATE: 73 BPM | TEMPERATURE: 97.7 F | DIASTOLIC BLOOD PRESSURE: 83 MMHG | HEIGHT: 64 IN | WEIGHT: 164.6 LBS | OXYGEN SATURATION: 96 % | BODY MASS INDEX: 28.1 KG/M2 | SYSTOLIC BLOOD PRESSURE: 138 MMHG

## 2025-07-15 DIAGNOSIS — J45.909 MILD ASTHMA WITHOUT COMPLICATION, UNSPECIFIED WHETHER PERSISTENT: Primary | ICD-10-CM

## 2025-07-15 DIAGNOSIS — F17.211 NICOTINE DEPENDENCE, CIGARETTES, IN REMISSION: ICD-10-CM

## 2025-07-15 DIAGNOSIS — R93.89 ABNORMAL CT OF THE CHEST: ICD-10-CM

## 2025-07-15 DIAGNOSIS — J47.9 BRONCHIECTASIS WITHOUT COMPLICATION: ICD-10-CM

## 2025-07-15 DIAGNOSIS — R91.1 PULMONARY NODULE: ICD-10-CM

## 2025-07-15 RX ORDER — IPRATROPIUM BROMIDE AND ALBUTEROL SULFATE 2.5; .5 MG/3ML; MG/3ML
3 SOLUTION RESPIRATORY (INHALATION)
COMMUNITY
Start: 2025-04-09 | End: 2025-07-15

## 2025-07-15 RX ORDER — PROGESTERONE 100 MG/1
100 CAPSULE ORAL
COMMUNITY
Start: 2025-06-09 | End: 2025-07-15

## 2025-07-15 RX ORDER — MUPIROCIN 2 %
1 OINTMENT (GRAM) TOPICAL
COMMUNITY
Start: 2025-06-09 | End: 2025-07-15

## 2025-07-15 RX ORDER — ROSUVASTATIN CALCIUM 10 MG/1
10 TABLET, COATED ORAL DAILY
COMMUNITY
End: 2025-07-15

## 2025-07-15 RX ORDER — ERGOCALCIFEROL 1.25 MG/1
1 CAPSULE, LIQUID FILLED ORAL WEEKLY
COMMUNITY
Start: 2025-06-13

## 2025-07-15 RX ORDER — FLUTICASONE FUROATE, UMECLIDINIUM BROMIDE AND VILANTEROL TRIFENATATE 200; 62.5; 25 UG/1; UG/1; UG/1
1 POWDER RESPIRATORY (INHALATION) DAILY
COMMUNITY
Start: 2025-04-10 | End: 2025-07-15 | Stop reason: SDUPTHER

## 2025-07-15 RX ORDER — FLUTICASONE FUROATE, UMECLIDINIUM BROMIDE AND VILANTEROL TRIFENATATE 200; 62.5; 25 UG/1; UG/1; UG/1
1 POWDER RESPIRATORY (INHALATION) DAILY
Qty: 1 EACH | Refills: 5 | Status: SHIPPED | OUTPATIENT
Start: 2025-07-15

## 2025-07-15 RX ORDER — ESTRADIOL 0.05 MG/D
1 PATCH, EXTENDED RELEASE TRANSDERMAL 2 TIMES WEEKLY
COMMUNITY
Start: 2025-06-09

## 2025-07-15 RX ORDER — PANTOPRAZOLE SODIUM 20 MG/1
20 TABLET, DELAYED RELEASE ORAL DAILY
COMMUNITY
End: 2025-07-15

## 2025-07-15 NOTE — PROGRESS NOTES
Primary Care Provider  Kendal Blackmon APRN   Referring Provider  LARRY Edgar    Patient Complaint  Establish Care, Asthma, and Cough      Subjective     Elvia Joyce is a 60 y.o. female with pertinent past medical history of asthma who presents as a new patient to [facility] to establish care       History of Present Illness  The patient presents for evaluation of asthma and to receive her influenza vaccine.    She has a known history of asthma, diagnosed through a pulmonary function test.     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  She also lives above it.    These episodes occur approximately every 6 to 8 weeks. She reports that her respiratory issues are typically allergy-related and occur momentarily when triggered by environmental factors like dust.    She has an indoor dog but does not believe it exacerbates her breathing issues.   She reports no exposure to secondhand smoke or any family history of lung cancer.   She does not have a gas fireplace or wood-burning stove.   She has not been hospitalized for respiratory issues in the past year.   She typically experiences respiratory issues during the winter months and had a bout of bronchitis earlier this winter.    She underwent a CT scan of her chest on 07/08/2025, which revealed a 4 mm nodule in the lateral aspect of her right upper lobe and some scarring around the bases of her lungs.   She has not undergone a pulmonary function test since 2020 due to claustrophobia during the test.     She has a history of smoking but quit 12 to 15 years ago.    She takes Trelegy as needed, which she finds effective.   She has previously tried albuterol and Symbicort but found them less effective than Trelegy.   She occasionally takes Benadryl for allergic reactions.   A pulmonologist  previously informed her that her symptoms are likely due to allergic reactions.  AirSupra is not on her formulary    She mentions that her mammogram showed something abnormal in both breasts and is awaiting further evaluation.      She also mentions that she doesn't like coming to the doctor.  Doesn't like to take daily medication.         SOCIAL HISTORY  Occupations: , [employer] owner  Exercise: Working in the yard for five to six hours straight  Tobacco: History of smoking, quit 12 to 15 years ago    FAMILY HISTORY  - Sister: Breast cancer  - Negative for lung cancer                        At present time patient denies headaches, chest pain, weight loss or hemoptysis. Patient denies fevers, chills and night sweats. Elvia Joyce is able to perform ADLs.      I have personally reviewed the review of systems, past family, social, medical and surgical histories; and agree with their findings.      Family History   Problem Relation Age of Onset    Heart failure Mother     Diabetes Mother     No Known Problems Father     Heart failure Sister     Cancer Sister     Asthma Brother     Emphysema Maternal Uncle     Diabetes Maternal Grandmother     Malig Hyperthermia Neg Hx         Social History     Socioeconomic History    Marital status:    Tobacco Use    Smoking status: Former     Current packs/day: 0.00     Types: Cigarettes     Quit date:      Years since quittin.5    Smokeless tobacco: Never   Vaping Use    Vaping status: Never Used   Substance and Sexual Activity    Alcohol use: Not Currently    Drug use: Never    Sexual activity: Defer        Past Medical History:   Diagnosis Date    Acute appendicitis with localized peritonitis 2023    Asthma     Hyperlipidemia LDL goal <100 2023        Immunization History   Administered Date(s) Administered    31-influenza Vac Quardvalent Preservativ 2020    COVID-19 (MODERNA) 1st,2nd,3rd Dose Monovalent 2021,  "06/14/2021    Fluzone  >6mos 10/03/2016    Fluzone (or Fluarix & Flulaval for VFC) >6mos 09/12/2017    Pneumococcal Conjugate 13-Valent (PCV13) 10/03/2016    Pneumococcal Polysaccharide (PPSV23) 02/03/2016       Allergies   Allergen Reactions    Hydrocodone Nausea And Vomiting    Codeine Unknown - Low Severity    Penicillins Unknown - Low Severity     As a child          Current Outpatient Medications:     estradiol (MINIVELLE, VIVELLE-DOT) 0.05 MG/24HR patch, Place 1 patch on the skin as directed by provider 2 (Two) Times a Week., Disp: , Rfl:     estrogens, conjugated, (PREMARIN) 0.3 MG tablet, Take 1 tablet by mouth Daily. Take daily for 21 days then do not take for 7 days., Disp: , Rfl:     Trelegy Ellipta 200-62.5-25 MCG/ACT inhaler, Inhale 1 puff Daily., Disp: 1 each, Rfl: 5    vitamin D (ERGOCALCIFEROL) 1.25 MG (98162 UT) capsule capsule, Take 1 capsule by mouth 1 (One) Time Per Week. (Patient not taking: Reported on 7/15/2025), Disp: , Rfl:        Objective       Physical Exam  Vital Signs:   /83 (BP Location: Right arm, Patient Position: Sitting, Cuff Size: Adult)   Pulse 73   Temp 97.7 °F (36.5 °C) (Tympanic)   Resp 16   Ht 161.3 cm (63.5\")   Wt 74.7 kg (164 lb 9.6 oz)   SpO2 96% Comment: room air  BMI 28.70 kg/m²   Body mass index is 28.7 kg/m².  Vital signs and BMI reviewed  General: WDWN, Alert, NAD.    HEENT: ATNC.  MMM  Chest:  good aeration, clear to auscultation bilaterally, no increased work of breathing, normal symmetric chest wall rise bilaterally  CV: RRR, no MGR  Extremities:  no clubbing, no edema  Neuro:  Awake, conversant, answering questions appropriately          Results Review  I have personally reviewed the prior office notes, hospital records, labs, and diagnostics.  [x] CMP  no hypercarbia  [x] CBC with differential.  EAC count high level 0.24  [x]  CT of Chest  Findings:    The visualized soft tissue structures of the base the neck including the thyroid appear within " normal limits. There is no lower cervical or axillary adenopathy.     The heart size is normal. There is no pericardial effusion. The aorta is normal in caliber without evidence of aneurysm formation. There is no coronary artery atherosclerotic calcification. The main pulmonary artery appears normal in caliber. There is no   mediastinal or hilar lymphadenopathy by size criteria. There are calcified left hilar lymph nodes likely related to chronic granulomatous disease.     The tracheobronchial tree is patent. There is no abnormal bronchial wall thickening or bronchiectasis. There is biapical pleural-parenchymal scarring. There is a 4 mm mean axis nodule within the lateral aspect of the right upper lobe best seen on image   42. This is stable from prior examination. There are no new or enlarging pulmonary nodules.     The esophagus is normal in course and caliber. Visualized portions of the upper abdomen demonstrate no acute findings. There are degenerative changes of the cervical spine.     IMPRESSION:  Impression:  1. Stable 4 mm pulmonary nodule within the lateral aspect of the right upper lobe. Recommend low-dose chest CT in 1 year.  2. Biapical pleural-parenchymal scarring.     Recommendation:  Continue annual screening with LDCT    [x]  PFT - not done due to claustrophobia  []  6 minute walk  []  2D echo  []  PET scan  [] Alpha-1 antitrypsin      Results  Labs   - Eosinophil count: 06/2025, 0.24    Imaging   - CT of the chest: 07/2025, 4 mm nodule in the lateral aspect of the right upper lobe and some scarring around the bases of the lung   - Chest x-ray: 2019, Chronic changes within the lungs and some bronchial wall thickening with reactive airway disease            Assessment         Patient Active Problem List   Diagnosis    Allergic rhinitis    Arthritis    Asthma    Kidney stones    Hyperlipidemia LDL goal <100    Abnormal CT of the chest    Pulmonary nodule    Nicotine dependence, cigarettes, in remission     Bronchiectasis without complication       Diagnoses and all orders for this visit:    1. Mild asthma without complication, unspecified whether persistent (Primary)  -     CT Chest Without Contrast; Future  -     CBC & Differential; Future  -     MATT Screen; Future  -     IgE Level; Future  -     ANCA Panel; Future  -     Comprehensive Metabolic Panel; Future    2. Abnormal CT of the chest    3. Pulmonary nodule    4. Nicotine dependence, cigarettes, in remission    5. Bronchiectasis without complication    Other orders  -     Trelegy Ellipta 200-62.5-25 MCG/ACT inhaler; Inhale 1 puff Daily.  Dispense: 1 each; Refill: 5       Plan        Assessment & Plan  1. Asthma.  The patient has a known history of asthma and has been using Trelegy as needed. She reported that her asthma symptoms are typically triggered by exposure to dust and allergens at her workplace, an [employer] shop. She experiences shortness of breath approximately every 6 to 8 weeks. Previous treatments with albuterol and Symbicort were ineffective.    Patient opts to NOT take Trelegy as scheduled.   She will continue using it when she is with the flares.    She was advised to continue using Trelegy. A prescription refill for Trelegy was provided and sent to the pharmacy.    2.  Abnormal CT of chest performed on 07/08/2025 revealed a 4 mm nodule in the lateral aspect of the right upper lobe, along with scarring around the bases of her lungs.   Repeat CT of chest ordered for one year to monitor lung nodule.    3.  Peripheral eosinophilia.  Her eosinophil count was slightly elevated at 0.24 in 06/2025.  Will repeat CBC with differential in one year    4.  Former smoker.  She has a history of cigarette smoking but quit 12 to 15 years ago. .    5. Health maintenance.  She was advised to follow up on her mammogram due to an abnormal finding on her right breast that requires further evaluation. If she does not hear back regarding the appointment, she should  follow up to ensure the test is completed.        Smoking status:  reports that she quit smoking about 12 years ago. Her smoking use included cigarettes. She has never used smokeless tobacco.    Vaccination status: Reviewed and declined to update  Immunization History   Administered Date(s) Administered    31-influenza Vac Quardvalent Preservativ 02/27/2020    COVID-19 (MODERNA) 1st,2nd,3rd Dose Monovalent 05/16/2021, 06/14/2021    Fluzone  >6mos 10/03/2016    Fluzone (or Fluarix & Flulaval for VFC) >6mos 09/12/2017    Pneumococcal Conjugate 13-Valent (PCV13) 10/03/2016    Pneumococcal Polysaccharide (PPSV23) 02/03/2016        Medications personally reviewed    Follow Up  Return in about 1 year (around 7/15/2026) for one year follow up CT for lung nodule, and trelegy refill.    Patient was given instructions and counseling regarding her condition or for health maintenance advice. Please see specific information pulled into the AVS if appropriate.     I spent 45 minutes caring for Elvia Joyce on this date of service. This time includes time spent by me in the following activities:preparing for the visit, reviewing tests, obtaining and/or reviewing a separately obtained history, performing a medically appropriate examination and/or evaluation, counseling and educating the patient/family/caregiver, ordering medications, tests, or procedures, documenting information in the medical record, independently interpreting results and communicating that information with the patient/family/caregiver and answered questions family members, discuss medications.     Patient or patient representative verbalized consent for the use of Ambient Listening during the visit with  CINDI Youngblood for chart documentation. 7/15/2025  23:44 EDT    CINDI Youngblood  07/15/25  13:13 EDT

## 2025-07-16 ENCOUNTER — TRANSCRIBE ORDERS (OUTPATIENT)
Dept: ADMINISTRATIVE | Facility: HOSPITAL | Age: 60
End: 2025-07-16
Payer: COMMERCIAL

## 2025-07-16 DIAGNOSIS — R92.8 ABNORMAL SCREENING MAMMOGRAM: Primary | ICD-10-CM

## 2025-07-28 ENCOUNTER — HOSPITAL ENCOUNTER (OUTPATIENT)
Dept: MAMMOGRAPHY | Facility: HOSPITAL | Age: 60
Discharge: HOME | End: 2025-07-28
Payer: COMMERCIAL

## 2025-07-28 ENCOUNTER — HOSPITAL ENCOUNTER (OUTPATIENT)
Dept: ULTRASOUND IMAGING | Facility: HOSPITAL | Age: 60
Discharge: HOME | End: 2025-07-28
Payer: COMMERCIAL

## 2025-07-28 DIAGNOSIS — R92.8 ABNORMAL SCREENING MAMMOGRAM: ICD-10-CM

## 2025-07-28 PROCEDURE — G0279 TOMOSYNTHESIS, MAMMO: HCPCS

## 2025-07-28 PROCEDURE — 77066 DX MAMMO INCL CAD BI: CPT

## 2025-07-28 PROCEDURE — 76642 ULTRASOUND BREAST LIMITED: CPT

## 2025-08-12 ENCOUNTER — OFFICE VISIT (OUTPATIENT)
Dept: CARDIOLOGY | Facility: CLINIC | Age: 60
End: 2025-08-12
Payer: COMMERCIAL

## 2025-08-12 VITALS
DIASTOLIC BLOOD PRESSURE: 74 MMHG | HEART RATE: 75 BPM | SYSTOLIC BLOOD PRESSURE: 127 MMHG | WEIGHT: 167.2 LBS | BODY MASS INDEX: 28.54 KG/M2 | HEIGHT: 64 IN

## 2025-08-12 DIAGNOSIS — R55 SYNCOPE AND COLLAPSE: Primary | ICD-10-CM

## 2025-08-12 DIAGNOSIS — Z78.9 STATIN INTOLERANCE: ICD-10-CM

## 2025-08-12 DIAGNOSIS — E78.2 MIXED HYPERLIPIDEMIA: ICD-10-CM

## 2025-08-12 DIAGNOSIS — I25.10 CORONARY ARTERY CALCIFICATION: ICD-10-CM

## 2025-08-12 PROCEDURE — 99214 OFFICE O/P EST MOD 30 MIN: CPT | Performed by: NURSE PRACTITIONER

## 2025-08-13 ENCOUNTER — SPECIALTY PHARMACY (OUTPATIENT)
Dept: CARDIOLOGY | Facility: CLINIC | Age: 60
End: 2025-08-13
Payer: COMMERCIAL

## 2025-08-22 ENCOUNTER — TELEPHONE (OUTPATIENT)
Dept: CARDIOLOGY | Facility: CLINIC | Age: 60
End: 2025-08-22
Payer: COMMERCIAL

## 2025-08-25 ENCOUNTER — SPECIALTY PHARMACY (OUTPATIENT)
Dept: CARDIOLOGY | Facility: CLINIC | Age: 60
End: 2025-08-25
Payer: COMMERCIAL

## 2025-08-26 ENCOUNTER — SPECIALTY PHARMACY (OUTPATIENT)
Facility: HOSPITAL | Age: 60
End: 2025-08-26
Payer: COMMERCIAL

## 2025-08-26 RX ORDER — EVOLOCUMAB 140 MG/ML
140 INJECTION, SOLUTION SUBCUTANEOUS
Qty: 6 ML | Refills: 3 | Status: SHIPPED | OUTPATIENT
Start: 2025-08-26

## (undated) DEVICE — ELECTRD BLD EDGE COAT 3IN

## (undated) DEVICE — STERILE POLYISOPRENE POWDER-FREE SURGICAL GLOVES: Brand: PROTEXIS

## (undated) DEVICE — PENCL E/S SMOKEEVAC W/TELESCP CANN

## (undated) DEVICE — GLV SURG SENSICARE SLT PF LF 7.5 STRL

## (undated) DEVICE — RETRACTABLE L-HOOK LAPAROSCOPIC SEALER/DIVIDER: Brand: LIGASURE

## (undated) DEVICE — SYR LUERLOK 30CC

## (undated) DEVICE — GENERAL LAPAROSCOPY-LF: Brand: MEDLINE INDUSTRIES, INC.

## (undated) DEVICE — 3M™ IOBAN™ 2 ANTIMICROBIAL INCISE DRAPE 6650EZ: Brand: IOBAN™ 2

## (undated) DEVICE — TISSUE RETRIEVAL SYSTEM: Brand: INZII RETRIEVAL SYSTEM

## (undated) DEVICE — ECHELON FLEX POWERED PLUS ARTICULATING ENDOSCOPIC LINEAR CUTTER , 60MM: Brand: ECHELON FLEX

## (undated) DEVICE — TROCARS: Brand: KII® BALLOON BLUNT TIP SYSTEM

## (undated) DEVICE — SOL IRR H2O BTL 1000ML STRL

## (undated) DEVICE — STERILE POLYISOPRENE POWDER-FREE SURGICAL GLOVES WITH EMOLLIENT COATING: Brand: PROTEXIS

## (undated) DEVICE — 2, DISPOSABLE SUCTION/IRRIGATOR WITHOUT DISPOSABLE TIP: Brand: STRYKEFLOW

## (undated) DEVICE — LAPAROSCOPIC TROCAR SLEEVE/SINGLE USE: Brand: KII® OPTICAL ACCESS SYSTEM

## (undated) DEVICE — GLV SURG BIOGEL LTX PF 7 1/2

## (undated) DEVICE — SLV SCD KN/LEN ADJ EXPRSS BLENDED MD 1P/U

## (undated) DEVICE — ENDOPATH XCEL WITH OPTIVIEW TECHNOLOGY BLADELESS TROCARS WITH STABILITY SLEEVES: Brand: ENDOPATH XCEL OPTIVIEW

## (undated) DEVICE — SUT MNCRYL 4/0 PS2 18 IN

## (undated) DEVICE — SUT VIC 0 UR6 27IN VCP603H

## (undated) DEVICE — PROXIMATE RH ROTATING HEAD SKIN STAPLERS (35 WIDE) CONTAINS 35 STAINLESS STEEL STAPLES: Brand: PROXIMATE

## (undated) DEVICE — LAPAROVUE VISIBILITY SYSTEM LAPAROSCOPIC SOLUTIONS: Brand: LAPAROVUE

## (undated) DEVICE — TROCAR: Brand: KII® SLEEVE

## (undated) DEVICE — ADHS SKIN PREMIERPRO EXOFIN TOPICAL HI/VISC .5ML